# Patient Record
Sex: FEMALE | Race: WHITE | Employment: OTHER | URBAN - METROPOLITAN AREA
[De-identification: names, ages, dates, MRNs, and addresses within clinical notes are randomized per-mention and may not be internally consistent; named-entity substitution may affect disease eponyms.]

---

## 2018-04-24 ENCOUNTER — ANESTHESIA EVENT (OUTPATIENT)
Dept: OPERATING ROOM | Age: 83
End: 2018-04-24
Payer: MEDICARE

## 2018-04-24 ASSESSMENT — LIFESTYLE VARIABLES: SMOKING_STATUS: 0

## 2018-04-25 ENCOUNTER — ANESTHESIA (OUTPATIENT)
Dept: OPERATING ROOM | Age: 83
End: 2018-04-25
Payer: MEDICARE

## 2018-04-25 ENCOUNTER — HOSPITAL ENCOUNTER (OUTPATIENT)
Age: 83
Setting detail: OUTPATIENT SURGERY
Discharge: HOME | End: 2018-04-25
Attending: SURGERY | Admitting: SURGERY
Payer: MEDICARE

## 2018-04-25 VITALS
WEIGHT: 152 LBS | BODY MASS INDEX: 29.84 KG/M2 | DIASTOLIC BLOOD PRESSURE: 70 MMHG | SYSTOLIC BLOOD PRESSURE: 132 MMHG | HEART RATE: 82 BPM | TEMPERATURE: 98 F | HEIGHT: 60 IN | OXYGEN SATURATION: 94 % | RESPIRATION RATE: 14 BRPM

## 2018-04-25 VITALS
SYSTOLIC BLOOD PRESSURE: 137 MMHG | RESPIRATION RATE: 24 BRPM | DIASTOLIC BLOOD PRESSURE: 58 MMHG | OXYGEN SATURATION: 90 %

## 2018-04-25 LAB — GLUCOSE BLD-MCNC: 170 MG/DL

## 2018-04-25 PROCEDURE — 6360000002 HC RX W HCPCS: Performed by: NURSE ANESTHETIST, CERTIFIED REGISTERED

## 2018-04-25 PROCEDURE — 3600000002 HC SURGERY LEVEL 2 BASE: Performed by: SURGERY

## 2018-04-25 PROCEDURE — 82962 GLUCOSE BLOOD TEST: CPT | Performed by: SURGERY

## 2018-04-25 PROCEDURE — 3700000001 HC ADD 15 MINUTES (ANESTHESIA): Performed by: SURGERY

## 2018-04-25 PROCEDURE — 2500000003 HC RX 250 WO HCPCS: Performed by: NURSE ANESTHETIST, CERTIFIED REGISTERED

## 2018-04-25 PROCEDURE — 7100000011 HC PHASE II RECOVERY - ADDTL 15 MIN: Performed by: SURGERY

## 2018-04-25 PROCEDURE — 3700000000 HC ANESTHESIA ATTENDED CARE: Performed by: SURGERY

## 2018-04-25 PROCEDURE — 2500000003 HC RX 250 WO HCPCS: Performed by: SURGERY

## 2018-04-25 PROCEDURE — 2709999900 HC NON-CHARGEABLE SUPPLY: Performed by: SURGERY

## 2018-04-25 PROCEDURE — 2580000003 HC RX 258: Performed by: ANESTHESIOLOGY

## 2018-04-25 PROCEDURE — 7100000010 HC PHASE II RECOVERY - FIRST 15 MIN: Performed by: SURGERY

## 2018-04-25 PROCEDURE — 3600000012 HC SURGERY LEVEL 2 ADDTL 15MIN: Performed by: SURGERY

## 2018-04-25 RX ORDER — LIDOCAINE HYDROCHLORIDE 20 MG/ML
INJECTION, SOLUTION EPIDURAL; INFILTRATION; INTRACAUDAL; PERINEURAL PRN
Status: DISCONTINUED | OUTPATIENT
Start: 2018-04-25 | End: 2018-04-25 | Stop reason: SDUPTHER

## 2018-04-25 RX ORDER — HYDROCODONE BITARTRATE AND ACETAMINOPHEN 5; 325 MG/1; MG/1
1 TABLET ORAL PRN
Status: DISCONTINUED | OUTPATIENT
Start: 2018-04-25 | End: 2018-04-25 | Stop reason: HOSPADM

## 2018-04-25 RX ORDER — LIDOCAINE HYDROCHLORIDE AND EPINEPHRINE 10; 10 MG/ML; UG/ML
INJECTION, SOLUTION INFILTRATION; PERINEURAL PRN
Status: DISCONTINUED | OUTPATIENT
Start: 2018-04-25 | End: 2018-04-25 | Stop reason: HOSPADM

## 2018-04-25 RX ORDER — HYDROCODONE BITARTRATE AND ACETAMINOPHEN 5; 325 MG/1; MG/1
2 TABLET ORAL PRN
Status: DISCONTINUED | OUTPATIENT
Start: 2018-04-25 | End: 2018-04-25 | Stop reason: HOSPADM

## 2018-04-25 RX ORDER — PROPOFOL 10 MG/ML
INJECTION, EMULSION INTRAVENOUS CONTINUOUS PRN
Status: DISCONTINUED | OUTPATIENT
Start: 2018-04-25 | End: 2018-04-25 | Stop reason: SDUPTHER

## 2018-04-25 RX ORDER — PROPOFOL 10 MG/ML
INJECTION, EMULSION INTRAVENOUS PRN
Status: DISCONTINUED | OUTPATIENT
Start: 2018-04-25 | End: 2018-04-25 | Stop reason: SDUPTHER

## 2018-04-25 RX ORDER — ALFENTANIL HYDROCHLORIDE 500 UG/ML
INJECTION INTRAVENOUS PRN
Status: DISCONTINUED | OUTPATIENT
Start: 2018-04-25 | End: 2018-04-25 | Stop reason: SDUPTHER

## 2018-04-25 RX ORDER — SODIUM CHLORIDE, SODIUM LACTATE, POTASSIUM CHLORIDE, CALCIUM CHLORIDE 600; 310; 30; 20 MG/100ML; MG/100ML; MG/100ML; MG/100ML
INJECTION, SOLUTION INTRAVENOUS CONTINUOUS
Status: DISCONTINUED | OUTPATIENT
Start: 2018-04-25 | End: 2018-04-25 | Stop reason: HOSPADM

## 2018-04-25 RX ORDER — BUPIVACAINE HYDROCHLORIDE AND EPINEPHRINE 2.5; 5 MG/ML; UG/ML
INJECTION, SOLUTION EPIDURAL; INFILTRATION; INTRACAUDAL; PERINEURAL PRN
Status: DISCONTINUED | OUTPATIENT
Start: 2018-04-25 | End: 2018-04-25 | Stop reason: HOSPADM

## 2018-04-25 RX ADMIN — PROPOFOL 50 MCG/KG/MIN: 10 INJECTION, EMULSION INTRAVENOUS at 10:14

## 2018-04-25 RX ADMIN — ALFENTANIL HYDROCHLORIDE 250 MCG: 500 INJECTION INTRAVENOUS at 10:25

## 2018-04-25 RX ADMIN — ALFENTANIL HYDROCHLORIDE 250 MCG: 500 INJECTION INTRAVENOUS at 10:50

## 2018-04-25 RX ADMIN — PROPOFOL 20 MG: 10 INJECTION, EMULSION INTRAVENOUS at 10:14

## 2018-04-25 RX ADMIN — LIDOCAINE HYDROCHLORIDE 25 MG: 20 INJECTION, SOLUTION EPIDURAL; INFILTRATION; INTRACAUDAL; PERINEURAL at 10:14

## 2018-04-25 RX ADMIN — SODIUM CHLORIDE, POTASSIUM CHLORIDE, SODIUM LACTATE AND CALCIUM CHLORIDE: 600; 310; 30; 20 INJECTION, SOLUTION INTRAVENOUS at 08:44

## 2018-04-25 RX ADMIN — ALFENTANIL HYDROCHLORIDE 250 MCG: 500 INJECTION INTRAVENOUS at 10:55

## 2018-04-25 RX ADMIN — ALFENTANIL HYDROCHLORIDE 250 MCG: 500 INJECTION INTRAVENOUS at 10:14

## 2018-04-25 ASSESSMENT — PAIN SCALES - GENERAL
PAINLEVEL_OUTOF10: 0
PAINLEVEL_OUTOF10: 0

## 2018-04-25 ASSESSMENT — PULMONARY FUNCTION TESTS
PIF_VALUE: 0

## 2018-04-25 ASSESSMENT — PAIN - FUNCTIONAL ASSESSMENT: PAIN_FUNCTIONAL_ASSESSMENT: 0-10

## 2018-04-25 ASSESSMENT — PAIN DESCRIPTION - DESCRIPTORS: DESCRIPTORS: NUMBNESS

## 2018-07-12 ENCOUNTER — HOSPITAL ENCOUNTER (EMERGENCY)
Age: 83
Discharge: HOME | End: 2018-07-12
Payer: MEDICARE

## 2018-07-12 VITALS
DIASTOLIC BLOOD PRESSURE: 70 MMHG | HEART RATE: 72 BPM | OXYGEN SATURATION: 98 % | SYSTOLIC BLOOD PRESSURE: 138 MMHG | TEMPERATURE: 98.3 F | RESPIRATION RATE: 20 BRPM

## 2018-07-12 DIAGNOSIS — N30.01 ACUTE CYSTITIS WITH HEMATURIA: Primary | ICD-10-CM

## 2018-07-12 DIAGNOSIS — R11.0 NAUSEA: ICD-10-CM

## 2018-07-12 LAB
ANION GAP SERPL CALCULATED.3IONS-SCNC: 11 MMOL/L (ref 7–16)
BACTERIA: ABNORMAL /HPF
BASOPHILS ABSOLUTE: 0.05 E9/L (ref 0–0.2)
BASOPHILS RELATIVE PERCENT: 0.6 % (ref 0–2)
BILIRUBIN URINE: NEGATIVE
BLOOD, URINE: ABNORMAL
BUN BLDV-MCNC: 7 MG/DL (ref 8–23)
CALCIUM SERPL-MCNC: 9.6 MG/DL (ref 8.6–10.2)
CHLORIDE BLD-SCNC: 106 MMOL/L (ref 98–107)
CLARITY: ABNORMAL
CO2: 21 MMOL/L (ref 22–29)
COLOR: YELLOW
CREAT SERPL-MCNC: 0.3 MG/DL (ref 0.5–1)
EOSINOPHILS ABSOLUTE: 1.09 E9/L (ref 0.05–0.5)
EOSINOPHILS RELATIVE PERCENT: 13.3 % (ref 0–6)
EPITHELIAL CELLS, UA: ABNORMAL /HPF
GFR AFRICAN AMERICAN: >60
GFR NON-AFRICAN AMERICAN: >60 ML/MIN/1.73
GLUCOSE BLD-MCNC: 130 MG/DL (ref 74–109)
GLUCOSE URINE: NEGATIVE MG/DL
HCT VFR BLD CALC: 38 % (ref 34–48)
HEMOGLOBIN: 13.3 G/DL (ref 11.5–15.5)
IMMATURE GRANULOCYTES #: 0.02 E9/L
IMMATURE GRANULOCYTES %: 0.2 % (ref 0–5)
KETONES, URINE: ABNORMAL MG/DL
LEUKOCYTE ESTERASE, URINE: ABNORMAL
LYMPHOCYTES ABSOLUTE: 1.74 E9/L (ref 1.5–4)
LYMPHOCYTES RELATIVE PERCENT: 21.3 % (ref 20–42)
MCH RBC QN AUTO: 32 PG (ref 26–35)
MCHC RBC AUTO-ENTMCNC: 35 % (ref 32–34.5)
MCV RBC AUTO: 91.3 FL (ref 80–99.9)
MONOCYTES ABSOLUTE: 0.71 E9/L (ref 0.1–0.95)
MONOCYTES RELATIVE PERCENT: 8.7 % (ref 2–12)
NEUTROPHILS ABSOLUTE: 4.56 E9/L (ref 1.8–7.3)
NEUTROPHILS RELATIVE PERCENT: 55.9 % (ref 43–80)
NITRITE, URINE: POSITIVE
PDW BLD-RTO: 12.4 FL (ref 11.5–15)
PH UA: 5.5 (ref 5–9)
PLATELET # BLD: 203 E9/L (ref 130–450)
PMV BLD AUTO: 9.4 FL (ref 7–12)
POTASSIUM SERPL-SCNC: 3.5 MMOL/L (ref 3.5–5)
PROTEIN UA: NEGATIVE MG/DL
RBC # BLD: 4.16 E12/L (ref 3.5–5.5)
RBC UA: ABNORMAL /HPF (ref 0–2)
SODIUM BLD-SCNC: 138 MMOL/L (ref 132–146)
SPECIFIC GRAVITY UA: 1.02 (ref 1–1.03)
UROBILINOGEN, URINE: 1 E.U./DL
WBC # BLD: 8.2 E9/L (ref 4.5–11.5)
WBC UA: >20 /HPF (ref 0–5)

## 2018-07-12 PROCEDURE — 80048 BASIC METABOLIC PNL TOTAL CA: CPT

## 2018-07-12 PROCEDURE — 85025 COMPLETE CBC W/AUTO DIFF WBC: CPT

## 2018-07-12 PROCEDURE — 99212 OFFICE O/P EST SF 10 MIN: CPT

## 2018-07-12 PROCEDURE — 81001 URINALYSIS AUTO W/SCOPE: CPT

## 2018-07-12 PROCEDURE — 87186 SC STD MICRODIL/AGAR DIL: CPT

## 2018-07-12 PROCEDURE — 36415 COLL VENOUS BLD VENIPUNCTURE: CPT

## 2018-07-12 PROCEDURE — 87088 URINE BACTERIA CULTURE: CPT

## 2018-07-12 RX ORDER — CEPHALEXIN 500 MG/1
500 CAPSULE ORAL 2 TIMES DAILY
Qty: 14 CAPSULE | Refills: 0 | Status: SHIPPED | OUTPATIENT
Start: 2018-07-12 | End: 2018-07-19

## 2018-07-12 RX ORDER — ONDANSETRON 4 MG/1
4 TABLET, ORALLY DISINTEGRATING ORAL EVERY 8 HOURS PRN
Qty: 10 TABLET | Refills: 0 | Status: SHIPPED | OUTPATIENT
Start: 2018-07-12 | End: 2019-06-12

## 2018-07-15 LAB
ORGANISM: ABNORMAL
URINE CULTURE, ROUTINE: ABNORMAL
URINE CULTURE, ROUTINE: ABNORMAL

## 2019-06-12 ENCOUNTER — OFFICE VISIT (OUTPATIENT)
Dept: PAIN MANAGEMENT | Age: 84
End: 2019-06-12
Payer: MEDICARE

## 2019-06-12 ENCOUNTER — PREP FOR PROCEDURE (OUTPATIENT)
Dept: PAIN MANAGEMENT | Age: 84
End: 2019-06-12

## 2019-06-12 VITALS
TEMPERATURE: 98.4 F | HEIGHT: 59 IN | OXYGEN SATURATION: 92 % | SYSTOLIC BLOOD PRESSURE: 100 MMHG | WEIGHT: 140 LBS | RESPIRATION RATE: 18 BRPM | HEART RATE: 61 BPM | DIASTOLIC BLOOD PRESSURE: 60 MMHG | BODY MASS INDEX: 28.22 KG/M2

## 2019-06-12 DIAGNOSIS — M50.30 DDD (DEGENERATIVE DISC DISEASE), CERVICAL: ICD-10-CM

## 2019-06-12 DIAGNOSIS — M48.061 SPINAL STENOSIS OF LUMBAR REGION, UNSPECIFIED WHETHER NEUROGENIC CLAUDICATION PRESENT: ICD-10-CM

## 2019-06-12 DIAGNOSIS — M19.012 OSTEOARTHRITIS OF BOTH SHOULDERS, UNSPECIFIED OSTEOARTHRITIS TYPE: Primary | ICD-10-CM

## 2019-06-12 DIAGNOSIS — M19.012 PRIMARY OSTEOARTHRITIS OF BOTH SHOULDERS: Chronic | ICD-10-CM

## 2019-06-12 DIAGNOSIS — M25.512 PAIN OF BOTH SHOULDER JOINTS: Primary | ICD-10-CM

## 2019-06-12 DIAGNOSIS — M19.011 PRIMARY OSTEOARTHRITIS OF BOTH SHOULDERS: Chronic | ICD-10-CM

## 2019-06-12 DIAGNOSIS — M19.011 OSTEOARTHRITIS OF BOTH SHOULDERS, UNSPECIFIED OSTEOARTHRITIS TYPE: Primary | ICD-10-CM

## 2019-06-12 DIAGNOSIS — M51.36 DDD (DEGENERATIVE DISC DISEASE), LUMBAR: ICD-10-CM

## 2019-06-12 DIAGNOSIS — M25.511 PAIN OF BOTH SHOULDER JOINTS: Primary | ICD-10-CM

## 2019-06-12 DIAGNOSIS — M25.512 PAIN OF BOTH SHOULDER JOINTS: ICD-10-CM

## 2019-06-12 DIAGNOSIS — M25.511 PAIN OF BOTH SHOULDER JOINTS: ICD-10-CM

## 2019-06-12 DIAGNOSIS — M25.511 CHRONIC PAIN OF BOTH SHOULDERS: ICD-10-CM

## 2019-06-12 DIAGNOSIS — M19.011 OSTEOARTHRITIS OF BOTH SHOULDERS, UNSPECIFIED OSTEOARTHRITIS TYPE: Chronic | ICD-10-CM

## 2019-06-12 DIAGNOSIS — M54.2 CERVICALGIA: ICD-10-CM

## 2019-06-12 DIAGNOSIS — M19.012 OSTEOARTHRITIS OF BOTH SHOULDERS, UNSPECIFIED OSTEOARTHRITIS TYPE: Chronic | ICD-10-CM

## 2019-06-12 DIAGNOSIS — M47.817 LUMBOSACRAL SPONDYLOSIS WITHOUT MYELOPATHY: ICD-10-CM

## 2019-06-12 DIAGNOSIS — G89.29 CHRONIC PAIN OF BOTH SHOULDERS: ICD-10-CM

## 2019-06-12 DIAGNOSIS — M25.512 CHRONIC PAIN OF BOTH SHOULDERS: ICD-10-CM

## 2019-06-12 PROCEDURE — 99204 OFFICE O/P NEW MOD 45 MIN: CPT | Performed by: ANESTHESIOLOGY

## 2019-06-12 RX ORDER — ATORVASTATIN CALCIUM 20 MG/1
TABLET, FILM COATED ORAL
Refills: 3 | COMMUNITY
Start: 2019-06-08 | End: 2019-06-12

## 2019-06-12 RX ORDER — ALPRAZOLAM 0.25 MG/1
TABLET ORAL
Refills: 1 | COMMUNITY
Start: 2019-04-15 | End: 2019-09-04

## 2019-06-12 RX ORDER — LISINOPRIL 10 MG/1
TABLET ORAL
Refills: 3 | COMMUNITY
Start: 2019-03-31 | End: 2019-06-12

## 2019-06-12 SDOH — HEALTH STABILITY: MENTAL HEALTH: HOW MANY STANDARD DRINKS CONTAINING ALCOHOL DO YOU HAVE ON A TYPICAL DAY?: 1 OR 2

## 2019-06-12 NOTE — PROGRESS NOTES
[address]        [address] [facility], [address] [facility]      [phone]                  Consult Note      Patient:  AURELIA Patel 1935    Date of Service:  19     Requesting Physician:  Ivy Potter, *    Reason for Consult:      Patient presents with complaints of Bilateral shoulder pain, chronic neck pain and low back pain    HISTORY OF PRESENT ILLNESS:      Ms. Jonathon Orlando is a 80 y.o. female presented today to [address] [place] [address] for evaluation of  :    1) Chronic bilateral shoulder pain from severe OA. She has undergone shoulder injection in the past - multiple times- which has helped her for few weeks. Last injection about 4-5 weeks ago. 2) Chronic neck pain. Had cevical JOHNNIE in the past which had helped her. No Upper extremity radiation    3) Chronic low back pain in the lumbar area- no LE radiation. Pain is constant and is described as aching, sharp and throbbing. Pain does not radiate    She  does not have numbness, tingling, weakness of the upper extremities and both lower extremities     Patient does not have new bladder or bowel dysfunction. She has chronic CAMERON - uses a diaper for few years. Alleviating factors include: rest and medications. Aggravating factors include: movement, standing, sitting, bending, lifting.      Pain causes functional limitations/ limits Adl's : Yes  She has been taking Norco: 7.5 mg 2-3 times a  Day given by her PCP - has been taking this for few years   Pain meds helps her with the pain and to be functional.    Previous treatments:   Physical Therapy : yes, none recently     Chiropractic treatment: no    Medications: - NSAID's : yes -             - Membrane stabilizers : no            - Opioids : yes,             - Adjuvants or Others : no    TENS Unit: no    Surgeries: no spine surgeries    Interventional Pain procedures/ nerve blocks: yes,       She has been on anticoagulation medications yes,  and include ASA. - 81    She has not been on herbal supplements. She is not diabetic. H/O Smoking: no  H/O alcohol abuse : no  H/O Illicit drug use : no    Employment: retired    Imaging:     She had recent X ray of the shoulder done - shows significant arthritis. Will get the reports of the recent imaging of the shoulder. 9/2016:  EXAM: XR SHOULDER RIGHT STANDARD, XR HUMERUS RIGHT STANDARD, XR RADIUS   ULNA RIGHT STANDARD       COMPARISON: None       HISTORY:  pain and decreased range of motion after fall       TECHNIQUE: 2 views right shoulder, 2 views right humerus and 2 views   right radius/ulna       FINDINGS: Severe glenohumeral arthritis identified. Shoulder alignment   is normal. The humerus is normal in contour. The radius and ulna are   also within normal limits without evidence of fracture.               Impression       Right shoulder:   1.  No acute findings.       Right humerus:   1.  No acute findings.       Right radius and ulna:   1.  No acute findings.        CT cervical spine:  2016:     There is normal alignment of the cervical spine.  The vertebral bodies   are normal in height and alignment.  There is no evidence of fracture   or dislocation.             Past Medical History:   Diagnosis Date    Arthritis     CAD (coronary artery disease)     Diabetes mellitus ([facility] [place] [address]     diet controlled    Hyperlipidemia     Hypertension     Latex allergy     Myocardial infarction [facility]) 2012    Shoulder pain        Past Surgical History:   Procedure Laterality Date    AORTIC VALVE REPLACEMENT  june 2012   [facility] CARDIAC SURGERY  june 2012    triple bypass    CARPAL TUNNEL RELEASE      CARPAL TUNNEL RELEASE Right 04/25/2018    CORONARY ARTERY BYPASS GRAFT  2012    NERVE BLOCK  11/24/14    bilateral shoulder injection #1    NERVE BLOCK Bilateral 12/1/2014    alexys shoulder injections  #2    NERVE BLOCK N/A 1/7/2015    cervical epidural  #1    NERVE BLOCK N/A 1 21 15    cerv ep #2    NERVE BLOCK Bilateral 01/23/2017    Bilateral shoulder injection #1    [facility] N/CARPAL TUNNEL SURG Right 4/25/2018    RIGHT HAND CARPAL TUNNEL RELEASE performed by Suzanna Mckay MD at [address] [place] [address]       Prior to Admission medications    Medication Sig Start Date End Date Taking? Authorizing Provider   ALPRAZolam (XANAX) 0.25 MG tablet TAKE 1/2 TABLET BY MOUTH ONE TIME DAILY AS NEEDED FOR ANXIETY (PRESCRIPTION TO LAST 30 DAYS) 4/15/19  Yes Historical Provider, MD   diphenhydrAMINE (BENADRYL) 25 MG capsule Take 25 mg by mouth every 6 hours as needed for Itching   Yes Historical Provider, MD   aspirin 81 MG tablet Take 81 mg by mouth daily Stopped 4 days pre op   Yes Historical Provider, MD   HYDROcodone-acetaminophen (NORCO) 7.5-325 MG per tablet Take 1 tablet by mouth every 8 hours as needed for Pain. Yes Historical Provider, MD   ATORVASTATIN CALCIUM PO Take 20 mg by mouth nightly    Yes Historical Provider, MD   amLODIPine (NORVASC) 10 MG tablet Take 10 mg by mouth daily    Yes Historical Provider, MD   metoprolol (LOPRESSOR) 25 MG tablet Take 12.5 mg by mouth 2 times daily. Yes Historical Provider, MD   LISINOPRIL PO Take 10 mg by mouth 2 times daily. Yes Historical Provider, MD       Allergies   Allergen Reactions    Latex Rash     contact    Adhesive Tape     Glimepiride      Itching--9/2013    Tetanus Toxoids Swelling       Social History     Socioeconomic History    Marital status:       Spouse name: Not on file    Number of children: Not on file    Years of education: Not on file    Highest education level: Not on file   Occupational History    Not on file   Social Needs    Financial resource strain: Not on file    Food insecurity:     Worry: Not on file     Inability: Not on file    Transportation needs:     Medical: Not on file     Non-medical: Not on file   Tobacco Use    Smoking status: Never Smoker    Smokeless tobacco: Never Used   Substance and Sexual Activity    Alcohol use: Yes     Drinks per session: 1 or 2     Binge frequency: Weekly    Drug use: No    Sexual activity: Never   Lifestyle    Physical activity:     Days per week: Not on file     Minutes per session: Not on file    Stress: Not on file   Relationships    Social connections:     Talks on phone: Not on file     Gets together: Not on file     Attends Tenriism service: Not on file     Active member of club or organization: Not on file     Attends meetings of clubs or organizations: Not on file     Relationship status: Not on file    Intimate partner violence:     Fear of current or ex partner: Not on file     Emotionally abused: Not on file     Physically abused: Not on file     Forced sexual activity: Not on file   Other Topics Concern    Not on file   Social History Narrative    Not on file       Family History   Problem Relation Age of Onset    Cancer Mother     Diabetes Mother     Hypertension Mother     Hypertension Father     Heart Failure Father     Diabetes Sister     Diabetes Sister     Hypertension Sister     Arthritis Other        REVIEW OF SYSTEMS:   Review of Systems - General ROS: negative for - chills, fever, night sweats, weight gain or weight loss  Psychological ROS: positive for - anxiety  Ophthalmic ROS: negative  ENT ROS: negative  Allergy and Immunology ROS: negative  Hematological and Lymphatic ROS: no bleeding disorder  Endocrine ROS: negative  Respiratory ROS: no cough, shortness of breath, or wheezing  Cardiovascular ROS: no chest pain or dyspnea on exertion  Gastrointestinal ROS: no abdominal pain, change in bowel habits, or black or bloody stools  Genito-Urinary ROS: no dysuria, trouble voiding, or hematuria  + for increased frequency  Musculoskeletal ROS: see HPI  Neurological ROS: no TIA or stroke symptoms  Dermatological ROS: Dry skin + Pruritis +     PHYSICAL EXAMINATION:      /60   Pulse 61   Temp 98.4 °F (36.9 °C)   Resp 18   Ht 4' 11\" (1.499 m)   Wt 140 lb (63.5 kg)   SpO2 92%   BMI 28.28 kg/m²     General:      General appearance:  Pleasant and well-hydrated, in no distress and A & O x 3  Build:Overweight  Function: Rises from a seated position with difficulty    HEENT:    Head:normocephalic, atraumatic  Pupils:regular, round, equal  Sclera: icterus absent    Lungs:    Breathing:normal breathing pattern     CVS:     RRR    Abdomen:    Shape:non-distended and normal  Tenderness:none  Guarding:none    Cervical spine:    Inspection:normal  Palpation: no tenderness paravertebral muscles, tenderness trapezium, left, right and positive. Range of motion:reduced flexion, extension, rotation bilaterally and is painful. Spurling's: negative bilaterally    Thoracic spine:     Spine inspection:normal   Palpation:No tenderness over the midline and paraspinal area, bilaterally  Range of motion:normal in flexion, extension rotation bilateral and is not painful. Lumbar spine:    Spine inspection: Normal   Palpation: Tenderness paravertebral muscles No bilaterally  Range of motion: Decreased, flexion Decreased, Lateral bending, extension and rotation bilaterally reduced is painful. Sacroiliac joint tenderness No bilaterally  ELINA test: negative bilaterally  Gaenslen's test:negative bilaterally   Piriformis tenderness: negative bilaterally  SLR : negative bilaterally  Trochanteric bursa tenderness: negative bilaterally  CVA tenderness:No       Musculoskeletal:    Trigger points in trapezius: No bilaterally  Trigger points in rhomboids: No bilaterally  Trigger points in Paravertebral: No bilaterally      Extremities:    Tremors:None bilaterally upper and lower  Range of motion:Generally reduced shoulders  Intact:Yes  Varicose veins:absent   Pulses:present Lt radial  Cyanosis:none  Edema:+ b/l LE     Shoulders:  Bilateral shoulder : ROM reduced, significant tenderness over the shoulder bilateral, significant pain on ROM, marked limited to ROM . Atrophy of the shoulder muscles noted. Knee:    ROM : decreased  Joint line tenderness : minimal    Neurological:    Sensory: Normal to light touch     Motor:   Weakness of the bilateral shoulder muscles (limited by pain) otherwise UE muscle strength  Is equal    B/l LE motor power 4-5/5    Reflexes:    Bilaterally equal 1-2+    Gait: does not use assistive device. Dermatology:    Skin:no rashes or lesions noted    Assessment/Plan:     Diagnosis Orders   1. Osteoarthritis of both shoulders, unspecified osteoarthritis type     2. Primary osteoarthritis of both shoulders     3. Chronic pain of both shoulders     4. DDD (degenerative disc disease), lumbar     5. Lumbosacral spondylosis without myelopathy     6. DDD (degenerative disc disease), cervical     7. Spinal stenosis of lumbar region, unspecified whether neurogenic claudication present         80 y.o. female with h/o b/l Chronic shoulder pain with severe OA. Failed multiple modalities of treatment. Including meds/ PT/ shoulder injection. Set up for bilateral suprascapular block under fluoroscopy. RBA discussed. TENS unit . Local compound cream.    Consider adjuvant meds later if pain persists. Apparently has seen ortho - but patient not too keen on surgery. Pain meds helps her with pain and functionality. Can continue this with her PCP. Has issues with dry skin and itching- recommend dermatology eval.    Urine screen today: no    Counseling :    Patient encouraged to stay active and to watch/lose weight    Encouraged to continue Regular home exercise program as tolerated - stretching / strengthening. Treatment plan discussed with the patient and her daughter including medication and procedure side effects. Controlled Substances Monitoring:  [place]ARRS reviewed- complaint.         Radha Brown MD    CC:    Dm Morales MD  [facility], [address] [facility]

## 2019-06-12 NOTE — PROGRESS NOTES
Patient:  AURELIA Marie 1935  Date of Service:  19        Patient presents with complaints of bilateral shoulder pain that started 15 years ago and has been getting worse. She states the pain began following No specific cause    Pain is constant and is described as aching, dull, sharp, penetrating, miserable and grinding. She rates the pain as a 8/10 on her worst day , 10/10 on her best day, and a 8/10 on average on the VAS scale. Pain does radiate to the upper extremities and between her scapulas. She  does not have numbness, tingling of the the upper extremities. Alleviating factors include: Norco.  Aggravating factors include:  movement, lifting. She states that the pain does keep her from sleeping at night. She took her last dose of Norco this morning.      She has been on anticoagulation medications to include ASA and is managed by PCP Dr. Jaret Boston    Previous treatments: Nerve block (Depo medrol and marcaine injections.)    Personal Expectations from this treatment: increase activity and decrease pain    /60   Pulse 61   Temp 98.4 °F (36.9 °C)   Resp 18   Ht 4' 11\" (1.499 m)   Wt 140 lb (63.5 kg)   SpO2 92%   BMI 28.28 kg/m²

## 2019-06-21 ENCOUNTER — ANESTHESIA EVENT (OUTPATIENT)
Dept: OPERATING ROOM | Age: 84
End: 2019-06-21
Payer: MEDICARE

## 2019-06-21 ASSESSMENT — LIFESTYLE VARIABLES: SMOKING_STATUS: 0

## 2019-06-21 NOTE — ANESTHESIA PRE PROCEDURE
Department of Anesthesiology  Preprocedure Note       Name:  Billey Shone   Age:  80 y.o.  :  1935                                          MRN:  [id number]         Date:  2019      Surgeon: Jermaine Martinez):  Ghislaine German MD    Procedure: BILATERAL SUPRASCAPULAR BLOCK UNDER FLUOROSCOPY (CPT: 68369) (Bilateral )    Medications prior to admission:   Prior to Admission medications    Medication Sig Start Date End Date Taking? Authorizing Provider   ALPRAZolam (XANAX) 0.25 MG tablet TAKE 1/2 TABLET BY MOUTH ONE TIME DAILY AS NEEDED FOR ANXIETY (PRESCRIPTION TO LAST 30 DAYS) 4/15/19   Historical Provider, MD   diphenhydrAMINE (BENADRYL) 25 MG capsule Take 25 mg by mouth every 6 hours as needed for Itching    Historical Provider, MD   aspirin 81 MG tablet Take 81 mg by mouth daily Stopped 4 days pre op    Historical Provider, MD   HYDROcodone-acetaminophen (NORCO) 7.5-325 MG per tablet Take 1 tablet by mouth every 8 hours as needed for Pain. Historical Provider, MD   ATORVASTATIN CALCIUM PO Take 20 mg by mouth nightly     Historical Provider, MD   amLODIPine (NORVASC) 10 MG tablet Take 10 mg by mouth daily     Historical Provider, MD   metoprolol (LOPRESSOR) 25 MG tablet Take 12.5 mg by mouth 2 times daily. Historical Provider, MD   LISINOPRIL PO Take 10 mg by mouth 2 times daily. Historical Provider, MD       Current medications:    Current Outpatient Medications   Medication Sig Dispense Refill    ALPRAZolam (XANAX) 0.25 MG tablet TAKE 1/2 TABLET BY MOUTH ONE TIME DAILY AS NEEDED FOR ANXIETY (PRESCRIPTION TO LAST 30 DAYS)  1    diphenhydrAMINE (BENADRYL) 25 MG capsule Take 25 mg by mouth every 6 hours as needed for Itching      aspirin 81 MG tablet Take 81 mg by mouth daily Stopped 4 days pre op      HYDROcodone-acetaminophen (NORCO) 7.5-325 MG per tablet Take 1 tablet by mouth every 8 hours as needed for Pain.        ATORVASTATIN CALCIUM PO Take 20 mg by mouth nightly       amLODIPine (NORVASC) 10 MG tablet Take 10 mg by mouth daily       metoprolol (LOPRESSOR) 25 MG tablet Take 12.5 mg by mouth 2 times daily.  LISINOPRIL PO Take 10 mg by mouth 2 times daily. No current facility-administered medications for this visit. Allergies: Allergies   Allergen Reactions    Latex Rash     contact    Adhesive Tape     Glimepiride      Itching--9/2013    Tetanus Toxoids Swelling       Problem List:    Patient Active Problem List   Diagnosis Code    Osteoarthritis of both shoulders M19.011, M19.012    DDD (degenerative disc disease), cervical M50.30    Cervical facet syndrome M47.812    Somatic dysfunction bilateral shoulders M99.09    Subarachnoid hemorrhage (HCC) I60.9    Type 2 diabetes mellitus (HCC) E11.9    Periorbital ecchymosis of right eye S00. 11XA    Chronic pain of both shoulders M25.511, G89.29, M25.512    DDD (degenerative disc disease), lumbar M51.36    Lumbosacral spondylosis without myelopathy M47.817    Spinal stenosis of lumbar region M48.061    Pain of both shoulder joints M25.511, M25.512    Cervicalgia M54.2       Past Medical History:        Diagnosis Date    Arthritis     CAD (coronary artery disease)     Diabetes mellitus ([facility] [address]     diet controlled    Hyperlipidemia     Hypertension     Latex allergy     Myocardial infarction [facility]) 2012    Shoulder pain        Past Surgical History:        Procedure Laterality Date    AORTIC VALVE REPLACEMENT  june 2012   [facility] CARDIAC SURGERY  june 2012    triple bypass    CARPAL TUNNEL RELEASE      CARPAL TUNNEL RELEASE Right 04/25/2018    CORONARY ARTERY BYPASS GRAFT  2012    NERVE BLOCK  11/24/14    bilateral shoulder injection #1    NERVE BLOCK Bilateral 12/1/2014    alexys shoulder injections  #2    NERVE BLOCK N/A 1/7/2015    cervical epidural  #1    NERVE BLOCK N/A 1 21 15    cerv ep #2    NERVE BLOCK Bilateral 01/23/2017    Bilateral shoulder injection #1    [facility] N/CARPAL TUNNEL SURG Right 4/25/2018    RIGHT HAND CARPAL TUNNEL RELEASE performed by Riya Bee MD at [address] History:    Social History     Tobacco Use    Smoking status: Never Smoker    Smokeless tobacco: Never Used   Substance Use Topics    Alcohol use: Yes     Drinks per session: 1 or 2     Binge frequency: Weekly                                Counseling given: Not Answered      Vital Signs (Current): There were no vitals filed for this visit. BP Readings from Last 3 Encounters:   06/12/19 100/60   07/12/18 138/70   04/25/18 (!) 137/58       NPO Status:                                                                                 BMI:   Wt Readings from Last 3 Encounters:   06/12/19 140 lb (63.5 kg)   04/25/18 152 lb (68.9 kg)   01/18/17 140 lb (63.5 kg)     There is no height or weight on file to calculate BMI.    CBC:   Lab Results   Component Value Date    WBC 8.2 07/12/2018    RBC 4.16 07/12/2018    HGB 13.3 07/12/2018    HCT 38.0 07/12/2018    MCV 91.3 07/12/2018    RDW 12.4 07/12/2018     07/12/2018       CMP:   Lab Results   Component Value Date     07/12/2018    K 3.5 07/12/2018     07/12/2018    CO2 21 07/12/2018    BUN 7 07/12/2018    CREATININE 0.3 07/12/2018    GFRAA >60 07/12/2018    LABGLOM >60 07/12/2018    GLUCOSE 130 07/12/2018    PROT 6.4 09/01/2016    CALCIUM 9.6 07/12/2018    BILITOT 0.9 09/01/2016    ALKPHOS 83 09/01/2016    AST 42 09/01/2016    ALT 43 09/01/2016       POC Tests: No results for input(s): POCGLU, POCNA, POCK, POCCL, POCBUN, POCHEMO, POCHCT in the last 72 hours.     Coags:   Lab Results   Component Value Date    PROTIME 10.8 09/01/2016    INR 1.0 09/01/2016    APTT 26.2 09/01/2016       HCG (If Applicable): No results found for: PREGTESTUR, PREGSERUM, HCG, HCGQUANT     ABGs: No results found for: PHART, PO2ART, VTJ6UCY, VBW5MKX, BEART, Q9TMHCIV     Type & Screen (If Applicable):  No results found for: [facility]    Anesthesia Evaluation  Patient summary reviewed and Nursing notes reviewed no history of anesthetic complications:   Airway: Mallampati: IV  TM distance: >3 FB   Neck ROM: full  Mouth opening: > = 3 FB Dental:    (+) edentulous      Pulmonary:Negative Pulmonary ROS and normal exam  breath sounds clear to auscultation      (-) not a current smoker                           Cardiovascular:    (+) hypertension:, valvular problems/murmurs (AVR):, past MI: > 6 months, CAD:, CABG/stent:, murmur, hyperlipidemia        Rhythm: regular  Rate: normal           Beta Blocker:  Dose within 24 Hrs         Neuro/Psych:   (+) neuromuscular disease:,              ROS comment: Hx subarachnoid hemorrhage GI/Hepatic/Renal:             Endo/Other:    (+) DiabetesType II DM, , : arthritis:., .                 Abdominal:         (-) obese     Vascular: negative vascular ROS. Anesthesia Plan      MAC     ASA 3     (Family will check with Dr. Soila Dumont to see if preop antibiotic is needed.)  Induction: intravenous. Anesthetic plan and risks discussed with patient. Plan discussed with CRNA. Attending anesthesiologist reviewed and agrees with Pre Eval content    Yonas Lemus MD   4/24/2018    DOS STAFF ADDENDUM:    Pt seen and examined, chart reviewed (including anesthesia, drug and allergy history). Anesthetic plan, risks, benefits, alternatives, and personnel involved discussed with patient. Patient verbalized an understanding and agrees to proceed. Plan discussed with care team members and agreed upon.     Yonas Lemus MD  Staff Anesthesiologist  8:38 AM

## 2019-06-25 ENCOUNTER — HOSPITAL ENCOUNTER (OUTPATIENT)
Age: 84
Setting detail: OUTPATIENT SURGERY
Discharge: HOME | End: 2019-06-25
Attending: ANESTHESIOLOGY | Admitting: ANESTHESIOLOGY
Payer: MEDICARE

## 2019-06-25 ENCOUNTER — ANESTHESIA (OUTPATIENT)
Dept: OPERATING ROOM | Age: 84
End: 2019-06-25
Payer: MEDICARE

## 2019-06-25 VITALS
OXYGEN SATURATION: 97 % | HEART RATE: 79 BPM | DIASTOLIC BLOOD PRESSURE: 64 MMHG | TEMPERATURE: 98 F | HEIGHT: 59 IN | WEIGHT: 146 LBS | SYSTOLIC BLOOD PRESSURE: 133 MMHG | RESPIRATION RATE: 16 BRPM | BODY MASS INDEX: 29.43 KG/M2

## 2019-06-25 VITALS
OXYGEN SATURATION: 99 % | RESPIRATION RATE: 17 BRPM | DIASTOLIC BLOOD PRESSURE: 82 MMHG | SYSTOLIC BLOOD PRESSURE: 196 MMHG

## 2019-06-25 DIAGNOSIS — M89.8X1 PAIN IN SCAPULA: ICD-10-CM

## 2019-06-25 PROCEDURE — 76942 ECHO GUIDE FOR BIOPSY: CPT

## 2019-06-25 PROCEDURE — 7100000011 HC PHASE II RECOVERY - ADDTL 15 MIN: Performed by: ANESTHESIOLOGY

## 2019-06-25 PROCEDURE — 3700000000 HC ANESTHESIA ATTENDED CARE: Performed by: ANESTHESIOLOGY

## 2019-06-25 PROCEDURE — 2580000003 HC RX 258: Performed by: ANESTHESIOLOGY

## 2019-06-25 PROCEDURE — 3700000001 HC ADD 15 MINUTES (ANESTHESIA): Performed by: ANESTHESIOLOGY

## 2019-06-25 PROCEDURE — 2500000003 HC RX 250 WO HCPCS: Performed by: ANESTHESIOLOGY

## 2019-06-25 PROCEDURE — 6360000002 HC RX W HCPCS: Performed by: NURSE ANESTHETIST, CERTIFIED REGISTERED

## 2019-06-25 PROCEDURE — 2709999900 HC NON-CHARGEABLE SUPPLY: Performed by: ANESTHESIOLOGY

## 2019-06-25 PROCEDURE — 3600000005 HC SURGERY LEVEL 5 BASE: Performed by: ANESTHESIOLOGY

## 2019-06-25 PROCEDURE — 64418 NJX AA&/STRD SPRSCAP NRV: CPT | Performed by: ANESTHESIOLOGY

## 2019-06-25 PROCEDURE — 76942 ECHO GUIDE FOR BIOPSY: CPT | Performed by: ANESTHESIOLOGY

## 2019-06-25 PROCEDURE — 7100000010 HC PHASE II RECOVERY - FIRST 15 MIN: Performed by: ANESTHESIOLOGY

## 2019-06-25 PROCEDURE — 3600000015 HC SURGERY LEVEL 5 ADDTL 15MIN: Performed by: ANESTHESIOLOGY

## 2019-06-25 PROCEDURE — 6360000002 HC RX W HCPCS: Performed by: ANESTHESIOLOGY

## 2019-06-25 RX ORDER — LIDOCAINE HYDROCHLORIDE 5 MG/ML
INJECTION, SOLUTION INFILTRATION; INTRAVENOUS PRN
Status: DISCONTINUED | OUTPATIENT
Start: 2019-06-25 | End: 2019-06-25 | Stop reason: ALTCHOICE

## 2019-06-25 RX ORDER — SODIUM CHLORIDE, SODIUM LACTATE, POTASSIUM CHLORIDE, CALCIUM CHLORIDE 600; 310; 30; 20 MG/100ML; MG/100ML; MG/100ML; MG/100ML
INJECTION, SOLUTION INTRAVENOUS CONTINUOUS
Status: DISCONTINUED | OUTPATIENT
Start: 2019-06-25 | End: 2019-06-25 | Stop reason: HOSPADM

## 2019-06-25 RX ORDER — FENTANYL CITRATE 50 UG/ML
INJECTION, SOLUTION INTRAMUSCULAR; INTRAVENOUS PRN
Status: DISCONTINUED | OUTPATIENT
Start: 2019-06-25 | End: 2019-06-25 | Stop reason: SDUPTHER

## 2019-06-25 RX ADMIN — SODIUM CHLORIDE, POTASSIUM CHLORIDE, SODIUM LACTATE AND CALCIUM CHLORIDE: 600; 310; 30; 20 INJECTION, SOLUTION INTRAVENOUS at 09:20

## 2019-06-25 RX ADMIN — FENTANYL CITRATE 25 MCG: 50 INJECTION, SOLUTION INTRAMUSCULAR; INTRAVENOUS at 10:35

## 2019-06-25 RX ADMIN — FENTANYL CITRATE 25 MCG: 50 INJECTION, SOLUTION INTRAMUSCULAR; INTRAVENOUS at 10:20

## 2019-06-25 RX ADMIN — FENTANYL CITRATE 25 MCG: 50 INJECTION, SOLUTION INTRAMUSCULAR; INTRAVENOUS at 10:16

## 2019-06-25 RX ADMIN — FENTANYL CITRATE 25 MCG: 50 INJECTION, SOLUTION INTRAMUSCULAR; INTRAVENOUS at 10:25

## 2019-06-25 ASSESSMENT — PULMONARY FUNCTION TESTS
PIF_VALUE: 0

## 2019-06-25 ASSESSMENT — PAIN - FUNCTIONAL ASSESSMENT: PAIN_FUNCTIONAL_ASSESSMENT: 0-10

## 2019-06-25 ASSESSMENT — PAIN SCALES - GENERAL
PAINLEVEL_OUTOF10: 0

## 2019-06-25 ASSESSMENT — PAIN DESCRIPTION - DESCRIPTORS: DESCRIPTORS: ACHING;DISCOMFORT

## 2019-06-25 NOTE — OP NOTE
with Band-Aid. Disposition the patient tolerated the procedure well and there were no complications . Vital signs remained stable throughout the procedure. The patient was escorted to the recovery area where they remained until discharge and written discharge instructions for the procedure were given. Patient had excellent pain relief immediately after the procedure. Plan: [facility] Santana will return to our pain management center as scheduled.      Aziza Andrade MD

## 2019-06-25 NOTE — H&P
Diabetes Mother     Hypertension Mother     Hypertension Father     Heart Failure Father     Diabetes Sister     Diabetes Sister     Hypertension Sister     Arthritis Other          REVIEW OF SYSTEMS:    CONSTITUTIONAL:  negative for  fevers, chills, sweats and fatigue    RESPIRATORY:  negative for  dry cough, cough with sputum, dyspnea, wheezing and chest pain    CARDIOVASCULAR:  negative for chest pain, dyspnea, palpitations, syncope    GASTROINTESTINAL:  negative for nausea, vomiting, change in bowel habits, diarrhea, constipation and abdominal pain    MUSCULOSKELETAL: negative for muscle weakness    SKIN: negative for itching or rashes. BEHAVIOR/PSYCH:  negative for poor appetite, increased appetite, decreased sleep and poor concentration    All other systems negative      PHYSICAL EXAM:    VITALS:  BP (!) 148/58   Pulse 78   Temp 98 °F (36.7 °C)   Resp 16   Ht 4' 11\" (1.499 m)   Wt 146 lb (66.2 kg)   SpO2 94%   BMI 29.49 kg/m²     CONSTITUTIONAL:  awake, alert, cooperative, no apparent distress, and appears stated age    EYES: PERRLA, EOMI    LUNGS:  No increased work of breathing, no audible wheezing    CARDIOVASCULAR:  regular rate and rhythm    ABDOMEN:  Soft non tender non distended     EXTREMITIES: no signs of clubbing or cyanosis. MUSCULOSKELETAL: negative for flaccid muscle tone or spastic movements. SKIN: gross examination reveals no signs of rashes, or diaphoresis. NEURO: Cranial nerves II-XII grossly intact. No signs of agitated mood.        Assessment/Plan:    Patient  is here for suprascapular nerve block for shoulder pain    Rosaline Chan MD

## 2019-07-31 ENCOUNTER — OFFICE VISIT (OUTPATIENT)
Dept: PAIN MANAGEMENT | Age: 84
End: 2019-07-31
Payer: MEDICARE

## 2019-07-31 VITALS
DIASTOLIC BLOOD PRESSURE: 58 MMHG | HEART RATE: 61 BPM | SYSTOLIC BLOOD PRESSURE: 120 MMHG | HEIGHT: 59 IN | TEMPERATURE: 98.3 F | OXYGEN SATURATION: 92 % | BODY MASS INDEX: 29.23 KG/M2 | WEIGHT: 145 LBS | RESPIRATION RATE: 18 BRPM

## 2019-07-31 DIAGNOSIS — M19.012 OSTEOARTHRITIS OF BOTH SHOULDERS, UNSPECIFIED OSTEOARTHRITIS TYPE: Primary | Chronic | ICD-10-CM

## 2019-07-31 DIAGNOSIS — M25.511 PAIN OF BOTH SHOULDER JOINTS: ICD-10-CM

## 2019-07-31 DIAGNOSIS — M47.812 CERVICAL FACET SYNDROME: Chronic | ICD-10-CM

## 2019-07-31 DIAGNOSIS — M19.011 OSTEOARTHRITIS OF BOTH SHOULDERS, UNSPECIFIED OSTEOARTHRITIS TYPE: Primary | Chronic | ICD-10-CM

## 2019-07-31 DIAGNOSIS — M51.36 DDD (DEGENERATIVE DISC DISEASE), LUMBAR: ICD-10-CM

## 2019-07-31 DIAGNOSIS — M25.511 CHRONIC PAIN OF BOTH SHOULDERS: ICD-10-CM

## 2019-07-31 DIAGNOSIS — M47.817 LUMBOSACRAL SPONDYLOSIS WITHOUT MYELOPATHY: ICD-10-CM

## 2019-07-31 DIAGNOSIS — G89.29 CHRONIC PAIN OF BOTH SHOULDERS: ICD-10-CM

## 2019-07-31 DIAGNOSIS — M25.512 CHRONIC PAIN OF BOTH SHOULDERS: ICD-10-CM

## 2019-07-31 DIAGNOSIS — M25.512 PAIN OF BOTH SHOULDER JOINTS: ICD-10-CM

## 2019-07-31 DIAGNOSIS — M50.30 DDD (DEGENERATIVE DISC DISEASE), CERVICAL: Chronic | ICD-10-CM

## 2019-07-31 PROCEDURE — 99213 OFFICE O/P EST LOW 20 MIN: CPT | Performed by: ANESTHESIOLOGY

## 2019-07-31 RX ORDER — ALPRAZOLAM 0.25 MG/1
TABLET ORAL
COMMUNITY
End: 2019-07-31

## 2019-07-31 RX ORDER — HYDROCODONE BITARTRATE AND ACETAMINOPHEN 7.5; 325 MG/1; MG/1
TABLET ORAL
COMMUNITY

## 2019-07-31 NOTE — PROGRESS NOTES
Billy Raza presents to the [facility] on 7/31/2019. Latasha Hamilton is complaining of pain in her shoulders. The pain is constant. The pain is described as aching, throbbing and dull. Pain is rated on her best day at a 2, on her worst day at a 5, and on average at a 3 on the VAS scale. She took her last dose of Norco this morning. Any procedures since your last visit: Yes, with 75 % relief. PROCEDURE: Bilateral suprascapular nerve block under Ultrasound guidanceguidance.       She has been on anticoagulation medications to include ASA and is managed by PCP. Pacemaker or defibrilator: No Physician managing device is .       BP (!) 120/58   Pulse 61   Temp 98.3 °F (36.8 °C)   Resp 18   Ht 4' 11\" (1.499 m)   Wt 145 lb (65.8 kg)   SpO2 92%   BMI 29.29 kg/m²      No LMP recorded.  Patient is postmenopausal.
normal. The humerus is normal in contour. The radius and ulna are   also within normal limits without evidence of fracture.               Impression       Right shoulder:   1.  No acute findings.       Right humerus:   1.  No acute findings.       Right radius and ulna:   1.  No acute findings.         CT cervical spine:  2016:      There is normal alignment of the cervical spine.  The vertebral bodies   are normal in height and alignment.  There is no evidence of fracture   or dislocation. Potential Aberrant Drug-Related Behavior: no     Urine Drug Screening: no    OARRS report[de-identified] yes today. 7/31/2019    Past Medical History:   Diagnosis Date    Arthritis     CAD (coronary artery disease)     Hyperlipidemia     Hypertension     Latex allergy     My[facility]) 2012    Shoulder pain        Past Surgical History:   Procedure Laterality Date    ANESTHESIA NERVE BLOCK Bilateral 6/25/2019    BILATERAL SUPRASCAPULAR BLOCK UNDER ULTRASOUND  (CPT: 45649) performed by Fabiano Garner MD at [address] [place] [address]  june 2012   [address]  june 2012    triple bypass    CARPAL TUNNEL RELEASE      CARPAL TUNNEL RELEASE Right 04/25/2018    CORONARY ARTERY BYPASS GRAFT  2012    NERVE BLOCK  11/24/14    bilateral shoulder injection #1    NERVE BLOCK Bilateral 12/1/2014    alexys shoulder injections  #2    NERVE BLOCK N/A 1/7/2015    cervical epidural  #1    NERVE BLOCK N/A 1 21 15    cerv ep #2    NERVE BLOCK Bilateral 01/23/2017    Bilateral shoulder injection #1    NERVE BLOCK Bilateral 06/25/2019    suprascapular block with sedation    [place] REVISE MEDIAN N/CARPAL TUNNEL SURG Right 4/25/2018    RIGHT HAND CARPAL TUNNEL RELEASE performed by Sanjuanita Zendejas MD at [address] [place] [address]       Prior to Admission medications    Medication Sig Start Date End Date Taking?  Authorizing Provider   HYDROcodone-acetaminophen (NORCO) 7.5-325 MG per tablet hydrocodone 7.5

## 2019-09-04 ENCOUNTER — OFFICE VISIT (OUTPATIENT)
Dept: PAIN MANAGEMENT | Age: 84
End: 2019-09-04
Payer: MEDICARE

## 2019-09-04 ENCOUNTER — PREP FOR PROCEDURE (OUTPATIENT)
Dept: PAIN MANAGEMENT | Age: 84
End: 2019-09-04

## 2019-09-04 VITALS
HEART RATE: 68 BPM | SYSTOLIC BLOOD PRESSURE: 128 MMHG | HEIGHT: 59 IN | DIASTOLIC BLOOD PRESSURE: 64 MMHG | RESPIRATION RATE: 16 BRPM | TEMPERATURE: 98.6 F | WEIGHT: 145 LBS | OXYGEN SATURATION: 94 % | BODY MASS INDEX: 29.23 KG/M2

## 2019-09-04 DIAGNOSIS — M25.512 PAIN OF BOTH SHOULDER JOINTS: ICD-10-CM

## 2019-09-04 DIAGNOSIS — M19.011 OSTEOARTHRITIS OF BOTH SHOULDERS, UNSPECIFIED OSTEOARTHRITIS TYPE: Primary | Chronic | ICD-10-CM

## 2019-09-04 DIAGNOSIS — M25.511 PAIN OF BOTH SHOULDER JOINTS: ICD-10-CM

## 2019-09-04 DIAGNOSIS — M19.012 OSTEOARTHRITIS OF BOTH SHOULDERS, UNSPECIFIED OSTEOARTHRITIS TYPE: Primary | Chronic | ICD-10-CM

## 2019-09-04 DIAGNOSIS — F11.90 CHRONIC, CONTINUOUS USE OF OPIOIDS: ICD-10-CM

## 2019-09-04 PROCEDURE — 99214 OFFICE O/P EST MOD 30 MIN: CPT | Performed by: ANESTHESIOLOGY

## 2019-09-04 PROCEDURE — 99213 OFFICE O/P EST LOW 20 MIN: CPT | Performed by: ANESTHESIOLOGY

## 2019-09-04 NOTE — PROGRESS NOTES
helps with pain and functionality, no SE's, no signs of misuse abuse or diversion. It is appropriate to continue the pain meds for pain relief and functional improvement. She can continue it from her PCP. Try stretching exercise.       Krunal Perez MD      CC:    Pola Reddy MD  [facility], [facility]

## 2019-09-28 ENCOUNTER — ANESTHESIA EVENT (OUTPATIENT)
Dept: OPERATING ROOM | Age: 84
End: 2019-09-28
Payer: MEDICARE

## 2019-09-28 ASSESSMENT — LIFESTYLE VARIABLES: SMOKING_STATUS: 0

## 2019-10-01 ENCOUNTER — ANESTHESIA (OUTPATIENT)
Dept: OPERATING ROOM | Age: 84
End: 2019-10-01
Payer: MEDICARE

## 2019-10-01 ENCOUNTER — HOSPITAL ENCOUNTER (OUTPATIENT)
Age: 84
Setting detail: OUTPATIENT SURGERY
Discharge: HOME | End: 2019-10-01
Attending: ANESTHESIOLOGY | Admitting: ANESTHESIOLOGY
Payer: MEDICARE

## 2019-10-01 VITALS
DIASTOLIC BLOOD PRESSURE: 40 MMHG | OXYGEN SATURATION: 96 % | TEMPERATURE: 98 F | HEIGHT: 59 IN | HEART RATE: 85 BPM | WEIGHT: 145 LBS | RESPIRATION RATE: 16 BRPM | BODY MASS INDEX: 29.23 KG/M2 | SYSTOLIC BLOOD PRESSURE: 155 MMHG

## 2019-10-01 VITALS
DIASTOLIC BLOOD PRESSURE: 58 MMHG | SYSTOLIC BLOOD PRESSURE: 167 MMHG | RESPIRATION RATE: 26 BRPM | OXYGEN SATURATION: 98 %

## 2019-10-01 PROCEDURE — 7100000011 HC PHASE II RECOVERY - ADDTL 15 MIN: Performed by: ANESTHESIOLOGY

## 2019-10-01 PROCEDURE — 2709999900 HC NON-CHARGEABLE SUPPLY: Performed by: ANESTHESIOLOGY

## 2019-10-01 PROCEDURE — 3600000005 HC SURGERY LEVEL 5 BASE: Performed by: ANESTHESIOLOGY

## 2019-10-01 PROCEDURE — 6360000002 HC RX W HCPCS: Performed by: NURSE ANESTHETIST, CERTIFIED REGISTERED

## 2019-10-01 PROCEDURE — 3600000015 HC SURGERY LEVEL 5 ADDTL 15MIN: Performed by: ANESTHESIOLOGY

## 2019-10-01 PROCEDURE — 64418 NJX AA&/STRD SPRSCAP NRV: CPT | Performed by: ANESTHESIOLOGY

## 2019-10-01 PROCEDURE — 2500000003 HC RX 250 WO HCPCS: Performed by: ANESTHESIOLOGY

## 2019-10-01 PROCEDURE — 2580000003 HC RX 258: Performed by: ANESTHESIOLOGY

## 2019-10-01 PROCEDURE — 7100000010 HC PHASE II RECOVERY - FIRST 15 MIN: Performed by: ANESTHESIOLOGY

## 2019-10-01 PROCEDURE — 6360000002 HC RX W HCPCS: Performed by: ANESTHESIOLOGY

## 2019-10-01 PROCEDURE — 76942 ECHO GUIDE FOR BIOPSY: CPT | Performed by: ANESTHESIOLOGY

## 2019-10-01 PROCEDURE — 76942 ECHO GUIDE FOR BIOPSY: CPT

## 2019-10-01 PROCEDURE — 3700000001 HC ADD 15 MINUTES (ANESTHESIA): Performed by: ANESTHESIOLOGY

## 2019-10-01 PROCEDURE — 3700000000 HC ANESTHESIA ATTENDED CARE: Performed by: ANESTHESIOLOGY

## 2019-10-01 RX ORDER — FENTANYL CITRATE 50 UG/ML
INJECTION, SOLUTION INTRAMUSCULAR; INTRAVENOUS PRN
Status: DISCONTINUED | OUTPATIENT
Start: 2019-10-01 | End: 2019-10-01 | Stop reason: SDUPTHER

## 2019-10-01 RX ORDER — SODIUM CHLORIDE, SODIUM LACTATE, POTASSIUM CHLORIDE, CALCIUM CHLORIDE 600; 310; 30; 20 MG/100ML; MG/100ML; MG/100ML; MG/100ML
INJECTION, SOLUTION INTRAVENOUS CONTINUOUS
Status: DISCONTINUED | OUTPATIENT
Start: 2019-10-01 | End: 2019-10-01 | Stop reason: HOSPADM

## 2019-10-01 RX ORDER — LIDOCAINE HYDROCHLORIDE 5 MG/ML
INJECTION, SOLUTION INFILTRATION; INTRAVENOUS PRN
Status: DISCONTINUED | OUTPATIENT
Start: 2019-10-01 | End: 2019-10-01 | Stop reason: ALTCHOICE

## 2019-10-01 RX ADMIN — FENTANYL CITRATE 25 MCG: 50 INJECTION, SOLUTION INTRAMUSCULAR; INTRAVENOUS at 11:49

## 2019-10-01 RX ADMIN — SODIUM CHLORIDE, POTASSIUM CHLORIDE, SODIUM LACTATE AND CALCIUM CHLORIDE: 600; 310; 30; 20 INJECTION, SOLUTION INTRAVENOUS at 09:51

## 2019-10-01 RX ADMIN — FENTANYL CITRATE 50 MCG: 50 INJECTION, SOLUTION INTRAMUSCULAR; INTRAVENOUS at 11:39

## 2019-10-01 ASSESSMENT — PAIN DESCRIPTION - DESCRIPTORS: DESCRIPTORS: DISCOMFORT

## 2019-10-01 ASSESSMENT — PULMONARY FUNCTION TESTS
PIF_VALUE: 0

## 2019-10-01 ASSESSMENT — PAIN - FUNCTIONAL ASSESSMENT: PAIN_FUNCTIONAL_ASSESSMENT: 0-10

## 2019-10-01 ASSESSMENT — PAIN SCALES - GENERAL
PAINLEVEL_OUTOF10: 0

## 2019-10-07 ENCOUNTER — TELEPHONE (OUTPATIENT)
Dept: PAIN MANAGEMENT | Age: 84
End: 2019-10-07

## 2019-10-22 ENCOUNTER — TELEPHONE (OUTPATIENT)
Dept: PAIN MANAGEMENT | Age: 84
End: 2019-10-22

## 2019-10-23 ENCOUNTER — OFFICE VISIT (OUTPATIENT)
Dept: PAIN MANAGEMENT | Age: 84
End: 2019-10-23
Payer: MEDICARE

## 2019-10-23 VITALS
RESPIRATION RATE: 18 BRPM | SYSTOLIC BLOOD PRESSURE: 120 MMHG | HEIGHT: 59 IN | HEART RATE: 56 BPM | DIASTOLIC BLOOD PRESSURE: 58 MMHG | OXYGEN SATURATION: 95 % | TEMPERATURE: 98.3 F | WEIGHT: 145 LBS | BODY MASS INDEX: 29.23 KG/M2

## 2019-10-23 DIAGNOSIS — M19.012 OSTEOARTHRITIS OF BOTH SHOULDERS, UNSPECIFIED OSTEOARTHRITIS TYPE: Primary | Chronic | ICD-10-CM

## 2019-10-23 DIAGNOSIS — M25.512 CHRONIC PAIN OF BOTH SHOULDERS: ICD-10-CM

## 2019-10-23 DIAGNOSIS — G89.29 CHRONIC PAIN OF BOTH SHOULDERS: ICD-10-CM

## 2019-10-23 DIAGNOSIS — M25.512 PAIN OF BOTH SHOULDER JOINTS: ICD-10-CM

## 2019-10-23 DIAGNOSIS — M25.511 CHRONIC PAIN OF BOTH SHOULDERS: ICD-10-CM

## 2019-10-23 DIAGNOSIS — M25.511 PAIN OF BOTH SHOULDER JOINTS: ICD-10-CM

## 2019-10-23 DIAGNOSIS — M54.2 CERVICALGIA: ICD-10-CM

## 2019-10-23 DIAGNOSIS — M48.061 SPINAL STENOSIS OF LUMBAR REGION, UNSPECIFIED WHETHER NEUROGENIC CLAUDICATION PRESENT: ICD-10-CM

## 2019-10-23 DIAGNOSIS — M19.011 OSTEOARTHRITIS OF BOTH SHOULDERS, UNSPECIFIED OSTEOARTHRITIS TYPE: Primary | Chronic | ICD-10-CM

## 2019-10-23 DIAGNOSIS — F11.90 CHRONIC, CONTINUOUS USE OF OPIOIDS: ICD-10-CM

## 2019-10-23 PROCEDURE — 99214 OFFICE O/P EST MOD 30 MIN: CPT | Performed by: ANESTHESIOLOGY

## 2019-10-23 PROCEDURE — 1036F TOBACCO NON-USER: CPT | Performed by: ANESTHESIOLOGY

## 2019-10-23 PROCEDURE — G8400 PT W/DXA NO RESULTS DOC: HCPCS | Performed by: ANESTHESIOLOGY

## 2019-10-23 PROCEDURE — G8427 DOCREV CUR MEDS BY ELIG CLIN: HCPCS | Performed by: ANESTHESIOLOGY

## 2019-10-23 PROCEDURE — 1123F ACP DISCUSS/DSCN MKR DOCD: CPT | Performed by: ANESTHESIOLOGY

## 2019-10-23 PROCEDURE — 99213 OFFICE O/P EST LOW 20 MIN: CPT | Performed by: ANESTHESIOLOGY

## 2019-10-23 PROCEDURE — G8417 CALC BMI ABV UP PARAM F/U: HCPCS | Performed by: ANESTHESIOLOGY

## 2019-10-23 PROCEDURE — G8484 FLU IMMUNIZE NO ADMIN: HCPCS | Performed by: ANESTHESIOLOGY

## 2019-10-23 PROCEDURE — 1090F PRES/ABSN URINE INCON ASSESS: CPT | Performed by: ANESTHESIOLOGY

## 2019-10-23 PROCEDURE — 4040F PNEUMOC VAC/ADMIN/RCVD: CPT | Performed by: ANESTHESIOLOGY

## 2019-10-23 RX ORDER — LISINOPRIL 10 MG/1
TABLET ORAL
Refills: 3 | COMMUNITY
Start: 2019-09-23 | End: 2019-10-23

## 2019-10-23 RX ORDER — ATORVASTATIN CALCIUM 20 MG/1
TABLET, FILM COATED ORAL
Refills: 3 | COMMUNITY
Start: 2019-09-01

## 2019-10-23 RX ORDER — DULOXETIN HYDROCHLORIDE 30 MG/1
30 CAPSULE, DELAYED RELEASE ORAL DAILY
Qty: 30 CAPSULE | Refills: 1 | Status: SHIPPED
Start: 2019-10-23 | End: 2020-03-12 | Stop reason: ALTCHOICE

## 2020-02-14 ENCOUNTER — OFFICE VISIT (OUTPATIENT)
Dept: PAIN MANAGEMENT | Age: 85
End: 2020-02-14
Payer: MEDICARE

## 2020-02-14 ENCOUNTER — PREP FOR PROCEDURE (OUTPATIENT)
Dept: PAIN MANAGEMENT | Age: 85
End: 2020-02-14

## 2020-02-14 VITALS
WEIGHT: 145 LBS | SYSTOLIC BLOOD PRESSURE: 126 MMHG | RESPIRATION RATE: 16 BRPM | HEART RATE: 64 BPM | BODY MASS INDEX: 29.23 KG/M2 | HEIGHT: 59 IN | DIASTOLIC BLOOD PRESSURE: 58 MMHG | TEMPERATURE: 98.5 F

## 2020-02-14 PROCEDURE — 4040F PNEUMOC VAC/ADMIN/RCVD: CPT | Performed by: ANESTHESIOLOGY

## 2020-02-14 PROCEDURE — 1036F TOBACCO NON-USER: CPT | Performed by: ANESTHESIOLOGY

## 2020-02-14 PROCEDURE — 1090F PRES/ABSN URINE INCON ASSESS: CPT | Performed by: ANESTHESIOLOGY

## 2020-02-14 PROCEDURE — 99214 OFFICE O/P EST MOD 30 MIN: CPT | Performed by: ANESTHESIOLOGY

## 2020-02-14 PROCEDURE — G8417 CALC BMI ABV UP PARAM F/U: HCPCS | Performed by: ANESTHESIOLOGY

## 2020-02-14 PROCEDURE — 1123F ACP DISCUSS/DSCN MKR DOCD: CPT | Performed by: ANESTHESIOLOGY

## 2020-02-14 PROCEDURE — G8400 PT W/DXA NO RESULTS DOC: HCPCS | Performed by: ANESTHESIOLOGY

## 2020-02-14 PROCEDURE — G8427 DOCREV CUR MEDS BY ELIG CLIN: HCPCS | Performed by: ANESTHESIOLOGY

## 2020-02-14 PROCEDURE — 99213 OFFICE O/P EST LOW 20 MIN: CPT | Performed by: ANESTHESIOLOGY

## 2020-02-14 PROCEDURE — G8484 FLU IMMUNIZE NO ADMIN: HCPCS | Performed by: ANESTHESIOLOGY

## 2020-02-14 NOTE — PROGRESS NOTES
TECHNIQUE: 2 views right shoulder, 2 views right humerus and 2 views   right radius/ulna       FINDINGS: Severe glenohumeral arthritis identified. Shoulder alignment   is normal. The humerus is normal in contour. The radius and ulna are   also within normal limits without evidence of fracture.               Impression       Right shoulder:   1.  No acute findings.       Right humerus:   1.  No acute findings.       Right radius and ulna:   1.  No acute findings.         CT cervical spine:  2016:      There is normal alignment of the cervical spine.  The vertebral bodies   are normal in height and alignment.  There is no evidence of fracture   or dislocation. Potential Aberrant Drug-Related Behavior: no     Urine Drug Screening: no    OARRS report[de-identified] yes   7/31/2019 consistent  9/4/2019 consistent  10/23/2019: consistent  Reviewed consistent.     Past Medical History:   Diagnosis Date    Arthritis     CAD (coronary artery disease)     Hyperlipidemia     Hypertension     Latex allergy     Myocardial infarction [facility]) 2012    Shoulder pain        Past Surgical History:   Procedure Laterality Date    ANESTHESIA NERVE BLOCK Bilateral 6/25/2019    BILATERAL SUPRASCAPULAR BLOCK UNDER ULTRASOUND  (CPT: 75856) performed by Lamonte Lang MD at [facility] Bilateral 10/1/2019    BILATERAL SUPRASCAPUER BLOCK UNDER ULTRASOUND GUIDANCE performed by Lamonte Lang MD at [address] [place] [address] Avenue  june 2012   [address]GERY  june 2012    triple bypass    CARPAL TUNNEL RELEASE      CARPAL TUNNEL RELEASE Right 04/25/2018    CORONARY ARTERY BYPASS GRAFT  2012    NERVE BLOCK  11/24/14    bilateral shoulder injection #1    NERVE BLOCK Bilateral 12/1/2014    alexys shoulder injections  #2    NERVE BLOCK N/A 1/7/2015    cervical epidural  #1    NERVE BLOCK N/A 1 21 15    cerv ep #2    NERVE BLOCK Bilateral 01/23/2017    Bilateral shoulder injection #1  NERVE BLOCK Bilateral 06/25/2019    suprascapular block with sedation    NERVE BLOCK Bilateral 10/01/2019    suprascapular     [place] REVISE MEDIAN N/CARPAL TUNNEL SURG Right 4/25/2018    RIGHT HAND CARPAL TUNNEL RELEASE performed by Ariel Concepcion MD at [address] [place] [address]       Prior to Admission medications    Medication Sig Start Date End Date Taking? Authorizing Provider   atorvastatin (LIPITOR) 20 MG tablet TAKE ONE TABLET BY MOUTH EVERY DAY FOR CHOLESTEROL 9/1/19  Yes Historical Provider, MD   HYDROcodone-acetaminophen (Bobby Bolls) 7.5-325 MG per tablet hydrocodone 7.5 mg-acetaminophen 325 mg tablet   Yes Historical Provider, MD   diphenhydrAMINE (BENADRYL) 25 MG capsule Take 25 mg by mouth every 6 hours as needed for Itching   Yes Historical Provider, MD   aspirin 81 MG tablet Take 81 mg by mouth daily Stopped 4 days pre op   Yes Historical Provider, MD   amLODIPine (NORVASC) 10 MG tablet Take 10 mg by mouth daily    Yes Historical Provider, MD   metoprolol (LOPRESSOR) 25 MG tablet Take 12.5 mg by mouth 2 times daily. Yes Historical Provider, MD   LISINOPRIL PO Take 10 mg by mouth 2 times daily. Yes Historical Provider, MD   DULoxetine (CYMBALTA) 30 MG extended release capsule Take 1 capsule by mouth daily  Patient not taking: Reported on 2/14/2020 10/23/19   Edin Fierro MD       Allergies   Allergen Reactions    Latex Rash     contact    Adhesive Tape     Glimepiride      Itching--9/2013    Tetanus Toxoids Swelling       Social History     Socioeconomic History    Marital status:       Spouse name: Not on file    Number of children: Not on file    Years of education: Not on file    Highest education level: Not on file   Occupational History    Not on file   Social Needs    Financial resource strain: Not on file    Food insecurity:     Worry: Not on file     Inability: Not on file    Transportation needs:     Medical: Not on file     Non-medical: Not on file   Tobacco Use    Smoking spine:     Inspection:normal  Palpation: no tenderness paravertebral muscles, tenderness trapezium, left, right and positive. Range of motion:reduced flexion, extension, rotation bilaterally and is painful. Spurling's: negative bilaterally     Thoracic spine:                Spine inspection:normal   Palpation:No tenderness over the midline and paraspinal area, bilaterally  Range of motion:normal in flexion, extension rotation bilateral and is not painful.     Lumbar spine:     Spine inspection: Normal   Palpation: Tenderness paravertebral muscles No bilaterally  Range of motion: Decreased, flexion Decreased, Lateral bending, extension and rotation bilaterally reduced is painful. Sacroiliac joint tenderness No bilaterally  ELINA test: negative bilaterally  Gaenslen's test:negative bilaterally   Piriformis tenderness: negative bilaterally  SLR : negative bilaterally  Trochanteric bursa tenderness: negative bilaterally  CVA tenderness: No       Musculoskeletal:     Trigger points in trapezius: No bilaterally  Trigger points in rhomboids: No bilaterally  Trigger points in Paravertebral: No bilaterally      Extremities:     Tremors:None bilaterally upper and lower  Varicose veins:absent      Shoulders:  Bilateral shoulder : ROM reduced, significant tenderness over the shoulder bilateral, significant pain on ROM, marked limited to ROM . Atrophy of the shoulder muscles noted.     Knee:     ROM : decreased  Joint line tenderness : minimal     Neurological:     Sensory: Normal to light touch      Motor:   Weakness of the bilateral shoulder muscles (limited by pain) otherwise UE muscle strength  Is equal     B/l LE motor power 4-5/5     Reflexes:    Bilaterally equal 1-2+     Gait: does not use assistive device.       Assessment/Plan:   Diagnosis Orders   1. Osteoarthritis of both shoulders, unspecified osteoarthritis type     2. Chronic pain of both shoulders     3. Chronic, continuous use of opioids     4.  Neuralgia and neuritis       H/o Bilateral shoulder pain due to OA of shoulder. S/p b/l suprascapular nerve block with USG guidance with excellent pain relief on the first time. Significant improvement In pain and quality of life. Patient was very happy with the outcome. Repeat procedure not much relief. Pain has recurred now. Will start on Cymbalta 30 mg po Q hs. Dosing and side effects explained. She had tried it at night time. Was anxious and had stopped. Recommend to trial at day time. If she notices side effects, to DC. Will schedule for b/l Suprascapular nerve Pulsed RFA under ultrasound guidance. RBA discussed. Will do procedure with moderate sedation. Takes Hydrocodone bid- tid given by her PCP which helps with pain and functionality, no SE's, no signs of misuse abuse or diversion. It is appropriate to continue the pain meds for pain relief and functional improvement. She can continue it from her PCP. Try stretching exercise. TENS unit- use more frequently. Prescribed Compound cream for local use. I gave a combination of diclofenac 3%, gabapentin 6%, baclofen 2%, lidocaine 5%, doxepin 5%. Use instructions, Dosing and side effects explained. Counseling regarding the proper medication use, importance of regular exercise program, detailed discussions about the planned procedure done.       Cuca Morin MD      CC:    Pedro Luis Negron MD  [facility] 06, [facility]

## 2020-02-17 PROBLEM — M79.2 NEURALGIA AND NEURITIS: Status: ACTIVE | Noted: 2020-02-17

## 2020-03-16 ENCOUNTER — ANESTHESIA EVENT (OUTPATIENT)
Dept: OPERATING ROOM | Age: 85
End: 2020-03-16
Payer: MEDICARE

## 2020-03-16 ASSESSMENT — LIFESTYLE VARIABLES: SMOKING_STATUS: 0

## 2020-03-16 NOTE — ANESTHESIA PRE PROCEDURE
Department of Anesthesiology  Preprocedure Note       Name:  Maribeth South   Age:  80 y.o.  :  1935                                          MRN:  [id number]         Date:  3/16/2020      Surgeon: Micaela Dean):  Veronica Bowles MD    Procedure: BILATERAL SUPRASCAPULAR NERVE RADIOFREQUENCY ABLATION WITH ULTRASOUND (CPT 65829 [id number]) SEDATION (Bilateral )    Medications prior to admission:   Prior to Admission medications    Medication Sig Start Date End Date Taking? Authorizing Provider   atorvastatin (LIPITOR) 20 MG tablet TAKE ONE TABLET BY MOUTH EVERY DAY FOR CHOLESTEROL 19  Yes Historical Provider, MD   HYDROcodone-acetaminophen ([address]) 7.5-325 MG per tablet hydrocodone 7.5 mg-acetaminophen 325 mg tablet   Yes Historical Provider, MD   diphenhydrAMINE (BENADRYL) 25 MG capsule Take 25 mg by mouth every 6 hours as needed for Itching   Yes Historical Provider, MD   aspirin 81 MG tablet Take 81 mg by mouth daily    Yes Historical Provider, MD   metoprolol (LOPRESSOR) 25 MG tablet Take 12.5 mg by mouth 2 times daily. Yes Historical Provider, MD   LISINOPRIL PO Take 10 mg by mouth 2 times daily. Yes Historical Provider, MD       Current medications:    No current facility-administered medications for this encounter. Current Outpatient Medications   Medication Sig Dispense Refill    atorvastatin (LIPITOR) 20 MG tablet TAKE ONE TABLET BY MOUTH EVERY DAY FOR CHOLESTEROL  3    HYDROcodone-acetaminophen (NORCO) 7.5-325 MG per tablet hydrocodone 7.5 mg-acetaminophen 325 mg tablet      diphenhydrAMINE (BENADRYL) 25 MG capsule Take 25 mg by mouth every 6 hours as needed for Itching      aspirin 81 MG tablet Take 81 mg by mouth daily       metoprolol (LOPRESSOR) 25 MG tablet Take 12.5 mg by mouth 2 times daily.  LISINOPRIL PO Take 10 mg by mouth 2 times daily. Allergies:     Allergies   Allergen Reactions    Latex Rash     contact    Adhesive Tape     Glimepiride Itching--9/2013    Tetanus Toxoids Swelling       Problem List:    Patient Active Problem List   Diagnosis Code    Osteoarthritis of both shoulders M19.011, M19.012    DDD (degenerative disc disease), cervical M50.30    Cervical facet syndrome M47.812    Somatic dysfunction bilateral shoulders M99.09    Subarachnoid hemorrhage (HCC) I60.9    Type 2 diabetes mellitus (HCC) E11.9    Periorbital ecchymosis of right eye S00. 11XA    Chronic pain of both shoulders M25.511, G89.29, M25.512    DDD (degenerative disc disease), lumbar M51.36    Lumbosacral spondylosis without myelopathy M47.817    Spinal stenosis of lumbar region M48.061    Pain of both shoulder joints M25.511, M25.512    Cervicalgia M54.2    Chronic, continuous use of opioids F11.90    Neuralgia and neuritis M79.2       Past Medical History:        Diagnosis Date    Arthritis     CAD (coronary artery disease)     2012    Hyperlipidemia     Hypertension     Latex allergy     Myocardial infarction [facility]) 2012    Shoulder pain        Past Surgical History:        Procedure Laterality Date    ANESTHESIA NERVE BLOCK Bilateral 6/25/2019    BILATERAL SUPRASCAPULAR BLOCK UNDER ULTRASOUND  (CPT: 29372) performed by Juliette Dawn MD at [address] [facility] [address] Bilateral 10/1/2019    BILATERAL [address] performed by Juliette Dawn MD at [address] [place] [address] Avenue  june 2012   [address]  june 2012    triple bypass    CARPAL TUNNEL RELEASE      CARPAL TUNNEL RELEASE Right 04/25/2018    CORONARY ARTERY BYPASS GRAFT  2012    NERVE BLOCK  11/24/14    bilateral shoulder injection #1    NERVE BLOCK Bilateral 12/1/2014    alexys shoulder injections  #2    NERVE BLOCK N/A 1/7/2015    cervical epidural  #1    NERVE BLOCK N/A 1 21 15    cerv ep #2    NERVE BLOCK Bilateral 01/23/2017    Bilateral shoulder injection #1    NERVE BLOCK Bilateral 06/25/2019 suprascapular block with sedation    NERVE BLOCK Bilateral 10/01/2019    suprascapular     [place] REVISE MEDIAN N/CARPAL TUNNEL SURG Right 4/25/2018    RIGHT HAND CARPAL TUNNEL RELEASE performed by Batsheva Mcintyre MD at [address] History:    Social History     Tobacco Use    Smoking status: Never Smoker    Smokeless tobacco: Never Used   Substance Use Topics    Alcohol use: Not Currently     Drinks per session: 1 or 2     Binge frequency: Weekly                                Counseling given: Not Answered      Vital Signs (Current):   Vitals:    03/12/20 1253   Weight: 145 lb (65.8 kg)   Height: 4' 11\" (1.499 m)                                              BP Readings from Last 3 Encounters:   02/14/20 (!) 126/58   10/23/19 (!) 120/58   10/01/19 (!) 167/58       NPO Status:  >8.H                                                                               BMI:   Wt Readings from Last 3 Encounters:   02/14/20 145 lb (65.8 kg)   10/23/19 145 lb (65.8 kg)   10/01/19 145 lb (65.8 kg)     Body mass index is 29.29 kg/m². CBC:   Lab Results   Component Value Date    WBC 8.2 07/12/2018    RBC 4.16 07/12/2018    HGB 13.3 07/12/2018    HCT 38.0 07/12/2018    MCV 91.3 07/12/2018    RDW 12.4 07/12/2018     07/12/2018       CMP:   Lab Results   Component Value Date     07/12/2018    K 3.5 07/12/2018     07/12/2018    CO2 21 07/12/2018    BUN 7 07/12/2018    CREATININE 0.3 07/12/2018    GFRAA >60 07/12/2018    LABGLOM >60 07/12/2018    GLUCOSE 130 07/12/2018    PROT 6.4 09/01/2016    CALCIUM 9.6 07/12/2018    BILITOT 0.9 09/01/2016    ALKPHOS 83 09/01/2016    AST 42 09/01/2016    ALT 43 09/01/2016       POC Tests: No results for input(s): POCGLU, POCNA, POCK, POCCL, POCBUN, POCHEMO, POCHCT in the last 72 hours.     Coags:   Lab Results   Component Value Date    PROTIME 10.8 09/01/2016    INR 1.0 09/01/2016    APTT 26.2 09/01/2016       HCG (If Applicable): No results found for: PREGTESTUR,

## 2020-03-17 ENCOUNTER — ANESTHESIA (OUTPATIENT)
Dept: OPERATING ROOM | Age: 85
End: 2020-03-17
Payer: MEDICARE

## 2020-03-17 ENCOUNTER — HOSPITAL ENCOUNTER (OUTPATIENT)
Age: 85
Setting detail: OUTPATIENT SURGERY
Discharge: HOME | End: 2020-03-17
Attending: ANESTHESIOLOGY | Admitting: ANESTHESIOLOGY
Payer: MEDICARE

## 2020-03-17 VITALS
TEMPERATURE: 98 F | OXYGEN SATURATION: 98 % | RESPIRATION RATE: 20 BRPM | DIASTOLIC BLOOD PRESSURE: 88 MMHG | WEIGHT: 138 LBS | HEART RATE: 84 BPM | HEIGHT: 59 IN | SYSTOLIC BLOOD PRESSURE: 154 MMHG | BODY MASS INDEX: 27.82 KG/M2

## 2020-03-17 VITALS
RESPIRATION RATE: 14 BRPM | TEMPERATURE: 98.6 F | OXYGEN SATURATION: 100 % | DIASTOLIC BLOOD PRESSURE: 78 MMHG | SYSTOLIC BLOOD PRESSURE: 222 MMHG

## 2020-03-17 PROCEDURE — 64640 INJECTION TREATMENT OF NERVE: CPT | Performed by: ANESTHESIOLOGY

## 2020-03-17 PROCEDURE — 2709999900 HC NON-CHARGEABLE SUPPLY: Performed by: ANESTHESIOLOGY

## 2020-03-17 PROCEDURE — 3700000000 HC ANESTHESIA ATTENDED CARE: Performed by: ANESTHESIOLOGY

## 2020-03-17 PROCEDURE — 76942 ECHO GUIDE FOR BIOPSY: CPT | Performed by: ANESTHESIOLOGY

## 2020-03-17 PROCEDURE — 7100000010 HC PHASE II RECOVERY - FIRST 15 MIN: Performed by: ANESTHESIOLOGY

## 2020-03-17 PROCEDURE — 2500000003 HC RX 250 WO HCPCS: Performed by: ANESTHESIOLOGY

## 2020-03-17 PROCEDURE — 6360000002 HC RX W HCPCS: Performed by: ANESTHESIOLOGY

## 2020-03-17 PROCEDURE — 6360000002 HC RX W HCPCS: Performed by: NURSE ANESTHETIST, CERTIFIED REGISTERED

## 2020-03-17 PROCEDURE — 7100000011 HC PHASE II RECOVERY - ADDTL 15 MIN: Performed by: ANESTHESIOLOGY

## 2020-03-17 PROCEDURE — C1713 ANCHOR/SCREW BN/BN,TIS/BN: HCPCS | Performed by: ANESTHESIOLOGY

## 2020-03-17 PROCEDURE — 3700000001 HC ADD 15 MINUTES (ANESTHESIA): Performed by: ANESTHESIOLOGY

## 2020-03-17 PROCEDURE — 3600000005 HC SURGERY LEVEL 5 BASE: Performed by: ANESTHESIOLOGY

## 2020-03-17 PROCEDURE — 2580000003 HC RX 258: Performed by: ANESTHESIOLOGY

## 2020-03-17 PROCEDURE — 3600000015 HC SURGERY LEVEL 5 ADDTL 15MIN: Performed by: ANESTHESIOLOGY

## 2020-03-17 RX ORDER — HYDROCODONE BITARTRATE AND ACETAMINOPHEN 5; 325 MG/1; MG/1
2 TABLET ORAL PRN
Status: DISCONTINUED | OUTPATIENT
Start: 2020-03-17 | End: 2020-03-17 | Stop reason: HOSPADM

## 2020-03-17 RX ORDER — BUPIVACAINE HYDROCHLORIDE 2.5 MG/ML
INJECTION, SOLUTION EPIDURAL; INFILTRATION; INTRACAUDAL PRN
Status: DISCONTINUED | OUTPATIENT
Start: 2020-03-17 | End: 2020-03-17 | Stop reason: ALTCHOICE

## 2020-03-17 RX ORDER — MIDAZOLAM HYDROCHLORIDE 1 MG/ML
INJECTION INTRAMUSCULAR; INTRAVENOUS PRN
Status: DISCONTINUED | OUTPATIENT
Start: 2020-03-17 | End: 2020-03-17 | Stop reason: SDUPTHER

## 2020-03-17 RX ORDER — FUROSEMIDE 20 MG/1
20 TABLET ORAL 2 TIMES DAILY
COMMUNITY

## 2020-03-17 RX ORDER — DIPHENHYDRAMINE HYDROCHLORIDE 50 MG/ML
12.5 INJECTION INTRAMUSCULAR; INTRAVENOUS
Status: DISCONTINUED | OUTPATIENT
Start: 2020-03-17 | End: 2020-03-17 | Stop reason: HOSPADM

## 2020-03-17 RX ORDER — HYDROCODONE BITARTRATE AND ACETAMINOPHEN 5; 325 MG/1; MG/1
1 TABLET ORAL PRN
Status: DISCONTINUED | OUTPATIENT
Start: 2020-03-17 | End: 2020-03-17 | Stop reason: HOSPADM

## 2020-03-17 RX ORDER — MEPERIDINE HYDROCHLORIDE 50 MG/ML
12.5 INJECTION INTRAMUSCULAR; INTRAVENOUS; SUBCUTANEOUS EVERY 5 MIN PRN
Status: DISCONTINUED | OUTPATIENT
Start: 2020-03-17 | End: 2020-03-17 | Stop reason: HOSPADM

## 2020-03-17 RX ORDER — MORPHINE SULFATE 2 MG/ML
1 INJECTION, SOLUTION INTRAMUSCULAR; INTRAVENOUS EVERY 5 MIN PRN
Status: DISCONTINUED | OUTPATIENT
Start: 2020-03-17 | End: 2020-03-17 | Stop reason: HOSPADM

## 2020-03-17 RX ORDER — LIDOCAINE HYDROCHLORIDE 5 MG/ML
INJECTION, SOLUTION INFILTRATION; INTRAVENOUS PRN
Status: DISCONTINUED | OUTPATIENT
Start: 2020-03-17 | End: 2020-03-17 | Stop reason: ALTCHOICE

## 2020-03-17 RX ORDER — LABETALOL HYDROCHLORIDE 5 MG/ML
5 INJECTION, SOLUTION INTRAVENOUS EVERY 10 MIN PRN
Status: DISCONTINUED | OUTPATIENT
Start: 2020-03-17 | End: 2020-03-17 | Stop reason: HOSPADM

## 2020-03-17 RX ORDER — METHYLPREDNISOLONE ACETATE 40 MG/ML
INJECTION, SUSPENSION INTRA-ARTICULAR; INTRALESIONAL; INTRAMUSCULAR; SOFT TISSUE PRN
Status: DISCONTINUED | OUTPATIENT
Start: 2020-03-17 | End: 2020-03-17 | Stop reason: ALTCHOICE

## 2020-03-17 RX ORDER — PROMETHAZINE HYDROCHLORIDE 25 MG/ML
25 INJECTION, SOLUTION INTRAMUSCULAR; INTRAVENOUS
Status: DISCONTINUED | OUTPATIENT
Start: 2020-03-17 | End: 2020-03-17 | Stop reason: HOSPADM

## 2020-03-17 RX ORDER — FENTANYL CITRATE 50 UG/ML
50 INJECTION, SOLUTION INTRAMUSCULAR; INTRAVENOUS EVERY 5 MIN PRN
Status: DISCONTINUED | OUTPATIENT
Start: 2020-03-17 | End: 2020-03-17 | Stop reason: HOSPADM

## 2020-03-17 RX ORDER — HYDRALAZINE HYDROCHLORIDE 20 MG/ML
5 INJECTION INTRAMUSCULAR; INTRAVENOUS EVERY 10 MIN PRN
Status: DISCONTINUED | OUTPATIENT
Start: 2020-03-17 | End: 2020-03-17 | Stop reason: HOSPADM

## 2020-03-17 RX ORDER — HYDROMORPHONE HYDROCHLORIDE 1 MG/ML
0.25 INJECTION, SOLUTION INTRAMUSCULAR; INTRAVENOUS; SUBCUTANEOUS EVERY 5 MIN PRN
Status: DISCONTINUED | OUTPATIENT
Start: 2020-03-17 | End: 2020-03-17 | Stop reason: HOSPADM

## 2020-03-17 RX ORDER — FENTANYL CITRATE 50 UG/ML
INJECTION, SOLUTION INTRAMUSCULAR; INTRAVENOUS PRN
Status: DISCONTINUED | OUTPATIENT
Start: 2020-03-17 | End: 2020-03-17 | Stop reason: SDUPTHER

## 2020-03-17 RX ORDER — SODIUM CHLORIDE, SODIUM LACTATE, POTASSIUM CHLORIDE, CALCIUM CHLORIDE 600; 310; 30; 20 MG/100ML; MG/100ML; MG/100ML; MG/100ML
INJECTION, SOLUTION INTRAVENOUS CONTINUOUS
Status: DISCONTINUED | OUTPATIENT
Start: 2020-03-17 | End: 2020-03-17 | Stop reason: HOSPADM

## 2020-03-17 RX ADMIN — FENTANYL CITRATE 25 MCG: 50 INJECTION, SOLUTION INTRAMUSCULAR; INTRAVENOUS at 09:29

## 2020-03-17 RX ADMIN — MIDAZOLAM 0.5 MG: 1 INJECTION INTRAMUSCULAR; INTRAVENOUS at 09:28

## 2020-03-17 RX ADMIN — SODIUM CHLORIDE, POTASSIUM CHLORIDE, SODIUM LACTATE AND CALCIUM CHLORIDE: 600; 310; 30; 20 INJECTION, SOLUTION INTRAVENOUS at 09:20

## 2020-03-17 ASSESSMENT — PAIN DESCRIPTION - DESCRIPTORS: DESCRIPTORS: ACHING

## 2020-03-17 ASSESSMENT — PAIN SCALES - GENERAL
PAINLEVEL_OUTOF10: 0
PAINLEVEL_OUTOF10: 0

## 2020-03-17 ASSESSMENT — PAIN - FUNCTIONAL ASSESSMENT: PAIN_FUNCTIONAL_ASSESSMENT: 0-10

## 2020-03-17 NOTE — H&P
[address]  [address] [facility], [address] [facility] [place]  [phone]    Procedure History & Physical      Simpson Danger     HPI:    Patient  is here for bilateral  for b/l Suprascapular nerve pulsed RFA under ultrasound guidance. Labs/imaging studies reviewed   All question and concerns addressed including R/B/A associated with the procedure    Past Medical History:   Diagnosis Date    Arthritis     CAD (coronary artery disease)     2012    Hyperlipidemia     Hypertension     Latex allergy     Myocardial infarction [facility]) 2012    Shoulder pain        Past Surgical History:   Procedure Laterality Date    ANESTHESIA NERVE BLOCK Bilateral 6/25/2019    BILATERAL SUPRASCAPULAR BLOCK UNDER ULTRASOUND  (CPT: 65878) performed by Aly London MD at [address] [facility] [address] Bilateral 10/1/2019    BILATERAL [address] [facility] [facility] [address] performed by Aly London MD at [address] [place] [address] Avenue  june 2012   [address]  june 2012    triple bypass    CARPAL TUNNEL RELEASE      CARPAL TUNNEL RELEASE Right 04/25/2018    CORONARY ARTERY BYPASS GRAFT  2012    NERVE BLOCK  11/24/14    bilateral shoulder injection #1    NERVE BLOCK Bilateral 12/1/2014    alexys shoulder injections  #2    NERVE BLOCK N/A 1/7/2015    cervical epidural  #1    NERVE BLOCK N/A 1 21 15    cerv ep #2    NERVE BLOCK Bilateral 01/23/2017    Bilateral shoulder injection #1    NERVE BLOCK Bilateral 06/25/2019    suprascapular block with sedation    NERVE BLOCK Bilateral 10/01/2019    suprascapular     [place] REVISE MEDIAN N/CARPAL TUNNEL SURG Right 4/25/2018    RIGHT HAND CARPAL TUNNEL RELEASE performed by Zelalem Forte MD at [address] [place] [address]       Prior to Admission medications    Medication Sig Start Date End Date Taking?  Authorizing Provider   furosemide (LASIX) 20 MG tablet Take 20 mg by mouth 2 times daily   Yes Historical Provider, MD under ultrasound guidance.     Diomedes Iverson MD

## 2020-03-17 NOTE — OP NOTE
3/17/2020    Patient: Damaris Montalvo  :  1935  Age:  80 y.o. Sex:  female     PRE-PROCEDURE DIAGNOSIS: Bilateral Shoulder pain, OA shoulder, neuralgia/ neuritis. POST-PROCEDURE DIAGNOSIS: Same. PROCEDURE: Bilateral suprascapular nerve radiofrequency ablation (Pulsed) under Ultrasound  guidance. SURGEON:  Siena Thompson MD    ANESTHESIA: MAC- see anesthesia records for details    ESTIMATED BLOOD LOSS: None.  ______________________________________________________________________  BRIEF HISTORY:  Damaris Montalvo comes in today for Bilateral suprascapular nerve radiofrequency ablation- pulsed under Ultrasound guidance. The potential complications of this procedure were discussed with her again today. She has elected to undergo the aforementioned procedure. Rohan complete History & Physical examination were reviewed in depth, a copy of which is in the chart. DESCRIPTION OF PROCEDURE:    After confirming written and informed consent, a time-out was performed and Rohan name and date of birth, the procedure to be performed as well as the plan for the location of the needle insertion were confirmed. The patient was brought into the procedure room and placed in the sitting position. The area of the Bilateral shoulder and scapula was prepped with ChloraPrep and draped in usual sterile manner. Under ultrasound guidance, the suprascapular notch was visualized. The skin and the subcutaneous tissue was anesthetized with 0.5% lidocaine. A 10 mm insulated RFA needle was inserted Bilaterally using in-plane technique until bony contact was made with the scapula at a point just below the suprascapular notch. The needle was then advanced closer to the suprascapular nerve. The nerve was tested for sensory stimulation until satisfactory results were obtained. Then 0.5% lidocaine 2 cc was injected before activation of the RF generator.  The RF electrode was then inserted through the cannula, and Pulsed RFA was done with temperature 42 degrees centigrade for 120 seconds. One RF lesion was made. Once the RF was finished, 4 ml of 0.25% marcaine with 30 mg DepoMedrol was injected on each side. The needle was then removed without any apparent complications. The patient's suprascapular area was cleaned and a bandage was placed over the insertion site. Disposition the patient tolerated the procedure well and there were no complications . Vital signs remained stable throughout the procedure. The patient was escorted to the recovery area where they remained until discharge and written discharge instructions for the procedure were given. Plan: Carolina Jimenez will return to our pain management center as scheduled.      Christine Lozano MD

## 2020-03-30 ENCOUNTER — TELEPHONE (OUTPATIENT)
Dept: PAIN MANAGEMENT | Age: 85
End: 2020-03-30

## 2020-04-03 ENCOUNTER — VIRTUAL VISIT (OUTPATIENT)
Dept: PAIN MANAGEMENT | Age: 85
End: 2020-04-03
Payer: MEDICARE

## 2020-04-03 PROCEDURE — 99442 PR PHYS/QHP TELEPHONE EVALUATION 11-20 MIN: CPT | Performed by: ANESTHESIOLOGY

## 2020-04-03 NOTE — PROGRESS NOTES
[address]  [address] [facility], [address] [place] [address] Francesco  [phone]  Telephone follow up visit      Date of Visit:  4/3/2020    CC:   Chief Complaint   Patient presents with    Follow-up    Shoulder Pain       Consent:    The patient and/or health care decision maker is aware that he/she may receive a bill for this telephone service, depending on his insurance coverage, and has provided verbal consent to proceed: Yes    I have considered the risks of abuse, dependence, addiction and diversion. My patient is aware that they will need a follow-up visit (in-person or virtually) at the appropriate time indicated for continued medications. Further, my patient is aware that when this acute crisis has lifted, they will be expected to return for an in-person visit and all elements of standard local and hospital guidelines in order to continue this medication. Patient location: Home     HPI:  H/o B/l Shoulder pain form OA. S/P Bilateral Suprascapular Pulsed RFA on 3/17/2020. She states that the pain relief is moderate. Pain is better. Appropriate analgesia with current medications regimen: yes . Change in quality of symptoms:no. Medication side effects:none. Recent diagnostic testing:none. Recent interventional procedures:Yes-good pain relief. Pain causes functional limitations/ limits Adl's : Yes    She is also taking pain meds- [place] by her PCP. She has been taking Norco: 7.5 mg 2-3 times a  Day given by her PCP - has been taking this for few years. No signs of misuse abuse or diversion. Pain medications apparently help her pain and improve functionality.     Pain meds helps her with the pain and to be functional.    She has  been on anticoagulation medications to include ASA-81 mg The patient  has not been on herbal supplements. The patient is not diabetic.   Pain is constant and is described as aching, sharp and throbbing.      Previous notes and Pain with USG guidance on 3/17/2020 with good pain relief. Significant improvement In pain and quality of life. Patient was very happy with the outcome. Takes Hydrocodone bid- tid given by her PCP which helps with pain and functionality, no SE's, no signs of misuse abuse or diversion.     It is appropriate to continue the pain meds for pain relief and functional improvement. She can continue it from her current prescriber- her PCP.     Try stretching exercise as tolerated.     TENS unit- use more frequently.     Compound cream for local use- combination of diclofenac 3%, gabapentin 6%, baclofen 2%, lidocaine 5%, doxepin 5%. Use instructions, Dosing and side effects explained.     Counseling regarding the proper medication use, importance of regular exercise program, detailed discussions about the planned procedure done. F/u in 3 months.     We discussed with the patient that combining opioids, benzodiazepines, alcohol, illicit drugs or sleep aids increases the risk of respiratory depression including death. We discussed that these medications may cause drowsiness, sedation or dizziness and have counseled the patient not to drive or operate machinery. We have discussed that these medications will be prescribed only by one provider. We have discussed with the patient about age related risk factors and have thoroughly discussed the importance of taking these medications as prescribed. The patient verbalizes understanding. Patient advised regarding steps to help prevent the spread of COVID-19   SOURCE - https://jacky-griffin.info/. html     1-Stay home except to get medical care  2-Clean your hands often for at least 20 seconds, avoid touching: Avoid touching your eyes, nose, and mouth with unwashed hands. 3-Seek medical attention: Seek prompt medical attention if your illness is worsening (e.g., difficulty breathing).   Call you doctor first.  3-Wear a facemask if you are sick   4-Cover your coughs and sneezes        I affirm this is a Patient Initiated Episode with an Established Patient who has not had a related appointment within my department in the past 7 days or scheduled within the next 24 hours.     Total Time: minutes: 11-20 minutes      Farshad Winston MD    CC:  Blanco Leal MD

## 2020-08-07 ENCOUNTER — OFFICE VISIT (OUTPATIENT)
Dept: PAIN MANAGEMENT | Age: 85
End: 2020-08-07
Payer: MEDICARE

## 2020-08-07 VITALS
TEMPERATURE: 97.3 F | OXYGEN SATURATION: 97 % | WEIGHT: 135 LBS | DIASTOLIC BLOOD PRESSURE: 62 MMHG | RESPIRATION RATE: 16 BRPM | SYSTOLIC BLOOD PRESSURE: 130 MMHG | BODY MASS INDEX: 27.21 KG/M2 | HEIGHT: 59 IN | HEART RATE: 71 BPM

## 2020-08-07 PROCEDURE — 4040F PNEUMOC VAC/ADMIN/RCVD: CPT | Performed by: ANESTHESIOLOGY

## 2020-08-07 PROCEDURE — 99213 OFFICE O/P EST LOW 20 MIN: CPT | Performed by: ANESTHESIOLOGY

## 2020-08-07 PROCEDURE — 1090F PRES/ABSN URINE INCON ASSESS: CPT | Performed by: ANESTHESIOLOGY

## 2020-08-07 PROCEDURE — G8427 DOCREV CUR MEDS BY ELIG CLIN: HCPCS | Performed by: ANESTHESIOLOGY

## 2020-08-07 PROCEDURE — G8400 PT W/DXA NO RESULTS DOC: HCPCS | Performed by: ANESTHESIOLOGY

## 2020-08-07 PROCEDURE — G8417 CALC BMI ABV UP PARAM F/U: HCPCS | Performed by: ANESTHESIOLOGY

## 2020-08-07 PROCEDURE — 1123F ACP DISCUSS/DSCN MKR DOCD: CPT | Performed by: ANESTHESIOLOGY

## 2020-08-07 PROCEDURE — 1036F TOBACCO NON-USER: CPT | Performed by: ANESTHESIOLOGY

## 2020-08-07 NOTE — PROGRESS NOTES
[address]  [address] [facility], [address] [place] [address] Francesco  [phone]    Follow up Note      Gilberto Nguyễn     Date of Visit:  8/7/2020    CC:  Patient presents for follow up   Chief Complaint   Patient presents with    Shoulder Pain       HPI:  H/o B/l Shoulder pain form OA.     S/P Bilateral Suprascapular Pulsed RFA on 3/17/2020.     She states that the pain relief is moderate for short period of time. Pain is unchanged. Change in quality of symptoms:no. Medication side effects:none. Recent diagnostic testing:none. Recent interventional procedures:none. Pain causes functional limitations/ limits Adl's : Yes     She is also taking pain meds- [place] by her PCP. She has been taking Norco. No signs of misuse abuse or diversion. Pain medications apparently help her pain and improve functionality. She has  been on anticoagulation medications to include ASA-81 mg The patient  has not been on herbal supplements.  The patient is not diabetic. Pain is constant and is described as aching, sharp and throbbing.      Nursing notes and details of the pain history reviewed. Please see intake notes for details. Imaging studies:  9/2016:  EXAM: XR SHOULDER RIGHT STANDARD, XR HUMERUS RIGHT STANDARD, XR RADIUS   ULNA RIGHT STANDARD       COMPARISON: None       HISTORY:  pain and decreased range of motion after fall       TECHNIQUE: 2 views right shoulder, 2 views right humerus and 2 views   right radius/ulna       FINDINGS: Severe glenohumeral arthritis identified. Shoulder alignment   is normal. The humerus is normal in contour.  The radius and ulna are   also within normal limits without evidence of fracture.               Impression       Right shoulder:   1.  No acute findings.       Right humerus:   1.  No acute findings.       Right radius and ulna:   1.  No acute findings.         CT cervical spine:  2016:      There is normal alignment of the cervical spine.  The MD at [address] [place] [address]       Prior to Admission medications    Medication Sig Start Date End Date Taking? Authorizing Provider   furosemide (LASIX) 20 MG tablet Take 20 mg by mouth 2 times daily   Yes Historical Provider, MD   atorvastatin (LIPITOR) 20 MG tablet TAKE ONE TABLET BY MOUTH EVERY DAY FOR CHOLESTEROL 9/1/19  Yes Historical Provider, MD   HYDROcodone-acetaminophen ([place]) 7.5-325 MG per tablet hydrocodone 7.5 mg-acetaminophen 325 mg tablet   Yes Historical Provider, MD   diphenhydrAMINE (BENADRYL) 25 MG capsule Take 25 mg by mouth every 6 hours as needed for Itching   Yes Historical Provider, MD   aspirin 81 MG tablet Take 81 mg by mouth daily    Yes Historical Provider, MD   metoprolol (LOPRESSOR) 25 MG tablet Take 12.5 mg by mouth 2 times daily. Yes Historical Provider, MD   LISINOPRIL PO Take 10 mg by mouth 2 times daily. Yes Historical Provider, MD       Allergies   Allergen Reactions    Latex Rash     contact    Adhesive Tape     Glimepiride      Itching--9/2013    Tetanus Toxoids Swelling       Social History     Socioeconomic History    Marital status:       Spouse name: Not on file    Number of children: Not on file    Years of education: Not on file    Highest education level: Not on file   Occupational History    Not on file   Social Needs    Financial resource strain: Not on file    Food insecurity     Worry: Not on file     Inability: Not on file    Transportation needs     Medical: Not on file     Non-medical: Not on file   Tobacco Use    Smoking status: Never Smoker    Smokeless tobacco: Never Used   Substance and Sexual Activity    Alcohol use: Not Currently     Drinks per session: 1 or 2     Binge frequency: Weekly    Drug use: No    Sexual activity: Never   Lifestyle    Physical activity     Days per week: Not on file     Minutes per session: Not on file    Stress: Not on file   Relationships    Social connections     Talks on phone: Not on file     Gets together: Not on file     Attends Jainism service: Not on file     Active member of club or organization: Not on file     Attends meetings of clubs or organizations: Not on file     Relationship status: Not on file    Intimate partner violence     Fear of current or ex partner: Not on file     Emotionally abused: Not on file     Physically abused: Not on file     Forced sexual activity: Not on file   Other Topics Concern    Not on file   Social History Narrative    Not on file       Family History   Problem Relation Age of Onset    Cancer Mother     Diabetes Mother     Hypertension Mother     Hypertension Father     Heart Failure Father     Diabetes Sister     Diabetes Sister     Hypertension Sister     Arthritis Other        REVIEW OF SYSTEMS:     Caron Suero denies fever/chills, chest pain, shortness of breath, new bowel or bladder complaints. All other review of systems was negative. PHYSICAL EXAMINATION:      /62   Pulse 71   Temp 97.3 °F (36.3 °C) (Infrared)   Resp 16   Ht 4' 11\" (1.499 m)   Wt 135 lb (61.2 kg)   SpO2 97%   BMI 27.27 kg/m²     General:       General appearance:  Pleasant and well-hydrated, in no distress and A & O x 3  Build:Overweight  Function: Rises from a seated position with difficulty     HEENT:     Head:normocephalic, atraumatic  Pupils:regular, round, equal  Sclera: icterus absent     Lungs:     Breathing:normal breathing pattern      CVS:     RRR     Abdomen:     Shape:non-distended and normal  Tenderness:none  Guarding:none     Cervical spine:     Reduced range of motion      Lumbar spine:     Range of motion is reduced      Musculoskeletal:     Trigger points -no      Extremities:     Tremors:None bilaterally upper and lower     Shoulders:  Bilateral shoulder : ROM reduced, significant tenderness over the shoulder bilateral, significant pain on ROM, marked limited to ROM .  Atrophy of the shoulder muscles noted.     Knee:     ROM : decreased  Joint line tenderness : minimal     Neurological:     Sensory: Normal to light touch      Motor:   Weakness of the bilateral shoulder muscles (limited by pain) otherwise UE muscle strength  Is equal     B/l LE motor power 4-5/5       Assessment/Plan:     Diagnosis Orders   1. Pain of both shoulder joints     2. Cervical facet syndrome     3. Neuralgia and neuritis     4. Osteoarthritis of both shoulders, unspecified osteoarthritis type     5. Chronic, continuous use of opioids       H/o Bilateral shoulder pain due to OA of shoulder. Failed conservative treatment   On chronic pain meds.     S/p b/l suprascapular nerve block with USG guidance with excellent pain relief on the first time-but subsequent interventions of the suprascapular block did not provide significant long-term pain relief. Apparently has been evaluated by a shoulder surgeon and contemplating shoulder surgery. If she decides not to have surgery can call for b/l  shoulder joint injection in office. Continue HEP. Counseling done regarding importance of regular exercise program and appropriate medication use. She is getting the pain medication from her primary care physician and will continue to do so.     Kamla Tsai MD

## 2020-08-07 NOTE — PROGRESS NOTES
Do you currently have any of the following:    Fever: No  Headache:  No  Cough: No  Shortness of breath: No  Exposed to anyone with these symptoms: No                                                                                                                Flory Abbott presents to the [facility] on 8/7/2020. Seretha Homans is complaining of pain bilateral shoulders. . The pain is constant. The pain is described as aching and throbbing. Pain is rated on her best day at a 8, on her worst day at a 10, and on average at a 9 on the VAS scale. She took her last dose of Norco this AM.. Seretha Homans does have issues with constipation. Any procedures since your last visit: No    She is not on NSAIDS and  is  on anticoagulation medications to include ASA and is managed by Pat Caballero MD  .     Pacemaker or defibrilator: No Physician managing device is NA.       /62   Pulse 71   Temp 97.3 °F (36.3 °C) (Infrared)   Resp 16   Ht 4' 11\" (1.499 m)   Wt 135 lb (61.2 kg)   SpO2 97%   BMI 27.27 kg/m²      No LMP recorded.  Patient is postmenopausal.

## 2020-11-06 ENCOUNTER — OFFICE VISIT (OUTPATIENT)
Dept: PAIN MANAGEMENT | Age: 85
End: 2020-11-06
Payer: MEDICARE

## 2020-11-06 VITALS
HEIGHT: 59 IN | BODY MASS INDEX: 27.42 KG/M2 | SYSTOLIC BLOOD PRESSURE: 132 MMHG | RESPIRATION RATE: 18 BRPM | OXYGEN SATURATION: 91 % | HEART RATE: 56 BPM | DIASTOLIC BLOOD PRESSURE: 58 MMHG | WEIGHT: 136 LBS | TEMPERATURE: 97.6 F

## 2020-11-06 PROCEDURE — 20610 DRAIN/INJ JOINT/BURSA W/O US: CPT | Performed by: ANESTHESIOLOGY

## 2020-11-06 PROCEDURE — 6360000002 HC RX W HCPCS

## 2020-11-06 PROCEDURE — 99213 OFFICE O/P EST LOW 20 MIN: CPT | Performed by: ANESTHESIOLOGY

## 2020-11-06 RX ORDER — BUPIVACAINE HYDROCHLORIDE 2.5 MG/ML
6 INJECTION, SOLUTION INFILTRATION; PERINEURAL ONCE
Status: COMPLETED | OUTPATIENT
Start: 2020-11-06 | End: 2020-11-06

## 2020-11-06 RX ORDER — METHYLPREDNISOLONE ACETATE 40 MG/ML
30 INJECTION, SUSPENSION INTRA-ARTICULAR; INTRALESIONAL; INTRAMUSCULAR; SOFT TISSUE ONCE
Status: COMPLETED | OUTPATIENT
Start: 2020-11-06 | End: 2020-11-06

## 2020-11-06 RX ADMIN — METHYLPREDNISOLONE ACETATE 40 MG: 40 INJECTION, SUSPENSION INTRA-ARTICULAR; INTRALESIONAL; INTRAMUSCULAR; SOFT TISSUE at 16:35

## 2020-11-06 RX ADMIN — BUPIVACAINE HYDROCHLORIDE 25 MG: 2.5 INJECTION, SOLUTION INFILTRATION; PERINEURAL at 16:33

## 2020-11-06 RX ADMIN — METHYLPREDNISOLONE ACETATE 40 MG: 40 INJECTION, SUSPENSION INTRA-ARTICULAR; INTRALESIONAL; INTRAMUSCULAR; SOFT TISSUE at 16:34

## 2020-11-06 NOTE — PROGRESS NOTES
Do you currently have any of the following:    Fever: No  Headache:  No  Cough: No  Shortness of breath: No  Exposed to anyone with these symptoms: No                                                                                                                Brendia Bound presents to the [facility] on 11/6/2020. Lasha Napoles is complaining of pain shoulder pain. The pain is constant. The pain is described as aching and sharp. Pain is rated on her best day at a 4, on her worst day at a 10, and on average at a 6 on the VAS scale. She took her last dose of Norco today. Lasha Napoles does have issues with constipation. She has under control. Any procedures since your last visit: No, with  % relief. She is not on NSAIDS and  is  on anticoagulation medications to include ASA and is managed by Nalini Zhang MD  .     Pacemaker or defibrilator: No Physician managing device is .       BP (!) 132/58   Pulse 56   Temp 97.6 °F (36.4 °C) (Infrared)   Resp 18   Ht 4' 11\" (1.499 m)   Wt 136 lb (61.7 kg)   SpO2 91%   BMI 27.47 kg/m²      No LMP recorded.  Patient is postmenopausal.

## 2022-06-22 ENCOUNTER — APPOINTMENT (OUTPATIENT)
Dept: CT IMAGING | Age: 87
DRG: 346 | End: 2022-06-22
Payer: MEDICARE

## 2022-06-22 ENCOUNTER — HOSPITAL ENCOUNTER (INPATIENT)
Age: 87
LOS: 1 days | Discharge: HOME | DRG: 346 | End: 2022-06-23
Attending: EMERGENCY MEDICINE | Admitting: SURGERY
Payer: MEDICARE

## 2022-06-22 ENCOUNTER — ANESTHESIA (OUTPATIENT)
Dept: ENDOSCOPY | Age: 87
DRG: 346 | End: 2022-06-22
Payer: MEDICARE

## 2022-06-22 ENCOUNTER — ANESTHESIA EVENT (OUTPATIENT)
Dept: ENDOSCOPY | Age: 87
DRG: 346 | End: 2022-06-22
Payer: MEDICARE

## 2022-06-22 DIAGNOSIS — K56.2 CECAL VOLVULUS (HCC): Primary | ICD-10-CM

## 2022-06-22 LAB
ALBUMIN SERPL-MCNC: 4.1 G/DL (ref 3.5–5.2)
ALP BLD-CCNC: 85 U/L (ref 35–104)
ALT SERPL-CCNC: 11 U/L (ref 0–32)
ANION GAP SERPL CALCULATED.3IONS-SCNC: 10 MMOL/L (ref 7–16)
AST SERPL-CCNC: 24 U/L (ref 0–31)
BASOPHILS ABSOLUTE: 0.02 E9/L (ref 0–0.2)
BASOPHILS RELATIVE PERCENT: 0.2 % (ref 0–2)
BILIRUB SERPL-MCNC: 1 MG/DL (ref 0–1.2)
BILIRUBIN URINE: NEGATIVE
BLOOD, URINE: NEGATIVE
BUN BLDV-MCNC: 12 MG/DL (ref 6–23)
CALCIUM SERPL-MCNC: 9.1 MG/DL (ref 8.6–10.2)
CHLORIDE BLD-SCNC: 100 MMOL/L (ref 98–107)
CLARITY: CLEAR
CO2: 25 MMOL/L (ref 22–29)
COLOR: YELLOW
CREAT SERPL-MCNC: 0.6 MG/DL (ref 0.5–1)
EOSINOPHILS ABSOLUTE: 0.48 E9/L (ref 0.05–0.5)
EOSINOPHILS RELATIVE PERCENT: 4.4 % (ref 0–6)
GFR AFRICAN AMERICAN: >60
GFR NON-AFRICAN AMERICAN: >60 ML/MIN/1.73
GLUCOSE BLD-MCNC: 167 MG/DL (ref 74–99)
GLUCOSE URINE: NEGATIVE MG/DL
HCT VFR BLD CALC: 36.3 % (ref 34–48)
HEMOGLOBIN: 12.1 G/DL (ref 11.5–15.5)
IMMATURE GRANULOCYTES #: 0.03 E9/L
IMMATURE GRANULOCYTES %: 0.3 % (ref 0–5)
KETONES, URINE: ABNORMAL MG/DL
LACTIC ACID: 0.5 MMOL/L (ref 0.5–2.2)
LEUKOCYTE ESTERASE, URINE: NEGATIVE
LIPASE: 19 U/L (ref 13–60)
LYMPHOCYTES ABSOLUTE: 1.15 E9/L (ref 1.5–4)
LYMPHOCYTES RELATIVE PERCENT: 10.4 % (ref 20–42)
MCH RBC QN AUTO: 32.2 PG (ref 26–35)
MCHC RBC AUTO-ENTMCNC: 33.3 % (ref 32–34.5)
MCV RBC AUTO: 96.5 FL (ref 80–99.9)
MONOCYTES ABSOLUTE: 0.59 E9/L (ref 0.1–0.95)
MONOCYTES RELATIVE PERCENT: 5.3 % (ref 2–12)
NEUTROPHILS ABSOLUTE: 8.76 E9/L (ref 1.8–7.3)
NEUTROPHILS RELATIVE PERCENT: 79.4 % (ref 43–80)
NITRITE, URINE: NEGATIVE
PDW BLD-RTO: 12.4 FL (ref 11.5–15)
PH UA: 5 (ref 5–9)
PLATELET # BLD: 169 E9/L (ref 130–450)
PMV BLD AUTO: 10.1 FL (ref 7–12)
POTASSIUM SERPL-SCNC: 4 MMOL/L (ref 3.5–5)
PROTEIN UA: NEGATIVE MG/DL
RBC # BLD: 3.76 E12/L (ref 3.5–5.5)
SODIUM BLD-SCNC: 135 MMOL/L (ref 132–146)
SPECIFIC GRAVITY UA: 1.02 (ref 1–1.03)
TOTAL PROTEIN: 6.6 G/DL (ref 6.4–8.3)
UROBILINOGEN, URINE: 0.2 E.U./DL
WBC # BLD: 11 E9/L (ref 4.5–11.5)

## 2022-06-22 PROCEDURE — 83690 ASSAY OF LIPASE: CPT

## 2022-06-22 PROCEDURE — 3700000001 HC ADD 15 MINUTES (ANESTHESIA): Performed by: INTERNAL MEDICINE

## 2022-06-22 PROCEDURE — 0D9L8ZZ DRAINAGE OF TRANSVERSE COLON, VIA NATURAL OR ARTIFICIAL OPENING ENDOSCOPIC: ICD-10-PCS | Performed by: INTERNAL MEDICINE

## 2022-06-22 PROCEDURE — 74177 CT ABD & PELVIS W/CONTRAST: CPT

## 2022-06-22 PROCEDURE — 2709999900 HC NON-CHARGEABLE SUPPLY: Performed by: INTERNAL MEDICINE

## 2022-06-22 PROCEDURE — 36415 COLL VENOUS BLD VENIPUNCTURE: CPT

## 2022-06-22 PROCEDURE — 99285 EMERGENCY DEPT VISIT HI MDM: CPT

## 2022-06-22 PROCEDURE — 99223 1ST HOSP IP/OBS HIGH 75: CPT | Performed by: SURGERY

## 2022-06-22 PROCEDURE — 7100000000 HC PACU RECOVERY - FIRST 15 MIN: Performed by: INTERNAL MEDICINE

## 2022-06-22 PROCEDURE — 85025 COMPLETE CBC W/AUTO DIFF WBC: CPT

## 2022-06-22 PROCEDURE — 6360000002 HC RX W HCPCS: Performed by: NURSE ANESTHETIST, CERTIFIED REGISTERED

## 2022-06-22 PROCEDURE — 6360000002 HC RX W HCPCS: Performed by: HOSPITALIST

## 2022-06-22 PROCEDURE — 6360000004 HC RX CONTRAST MEDICATION: Performed by: RADIOLOGY

## 2022-06-22 PROCEDURE — 80053 COMPREHEN METABOLIC PANEL: CPT

## 2022-06-22 PROCEDURE — 6360000002 HC RX W HCPCS: Performed by: EMERGENCY MEDICINE

## 2022-06-22 PROCEDURE — 2580000003 HC RX 258: Performed by: NURSE ANESTHETIST, CERTIFIED REGISTERED

## 2022-06-22 PROCEDURE — 83605 ASSAY OF LACTIC ACID: CPT

## 2022-06-22 PROCEDURE — 2580000003 HC RX 258: Performed by: EMERGENCY MEDICINE

## 2022-06-22 PROCEDURE — 2500000003 HC RX 250 WO HCPCS: Performed by: INTERNAL MEDICINE

## 2022-06-22 PROCEDURE — 81003 URINALYSIS AUTO W/O SCOPE: CPT

## 2022-06-22 PROCEDURE — 1200000000 HC SEMI PRIVATE

## 2022-06-22 PROCEDURE — 96374 THER/PROPH/DIAG INJ IV PUSH: CPT

## 2022-06-22 PROCEDURE — 6360000002 HC RX W HCPCS: Performed by: SURGERY

## 2022-06-22 PROCEDURE — 3609155600 HC COLONOSCOPY WITH DECOMPRESSION: Performed by: INTERNAL MEDICINE

## 2022-06-22 PROCEDURE — 3700000000 HC ANESTHESIA ATTENDED CARE: Performed by: INTERNAL MEDICINE

## 2022-06-22 PROCEDURE — 94150 VITAL CAPACITY TEST: CPT

## 2022-06-22 PROCEDURE — 7100000001 HC PACU RECOVERY - ADDTL 15 MIN: Performed by: INTERNAL MEDICINE

## 2022-06-22 RX ORDER — MORPHINE SULFATE 4 MG/ML
4 INJECTION, SOLUTION INTRAMUSCULAR; INTRAVENOUS
Status: DISCONTINUED | OUTPATIENT
Start: 2022-06-22 | End: 2022-06-22 | Stop reason: SDUPTHER

## 2022-06-22 RX ORDER — MORPHINE SULFATE 2 MG/ML
2 INJECTION, SOLUTION INTRAMUSCULAR; INTRAVENOUS
Status: DISCONTINUED | OUTPATIENT
Start: 2022-06-22 | End: 2022-06-22 | Stop reason: SDUPTHER

## 2022-06-22 RX ORDER — ENOXAPARIN SODIUM 100 MG/ML
40 INJECTION SUBCUTANEOUS DAILY
Status: DISCONTINUED | OUTPATIENT
Start: 2022-06-22 | End: 2022-06-23 | Stop reason: HOSPADM

## 2022-06-22 RX ORDER — SODIUM CHLORIDE 9 MG/ML
INJECTION, SOLUTION INTRAVENOUS CONTINUOUS
Status: DISCONTINUED | OUTPATIENT
Start: 2022-06-22 | End: 2022-06-23 | Stop reason: HOSPADM

## 2022-06-22 RX ORDER — METOPROLOL TARTRATE 5 MG/5ML
5 INJECTION INTRAVENOUS EVERY 8 HOURS
Status: DISCONTINUED | OUTPATIENT
Start: 2022-06-22 | End: 2022-06-23

## 2022-06-22 RX ORDER — SODIUM CHLORIDE, SODIUM LACTATE, POTASSIUM CHLORIDE, CALCIUM CHLORIDE 600; 310; 30; 20 MG/100ML; MG/100ML; MG/100ML; MG/100ML
INJECTION, SOLUTION INTRAVENOUS CONTINUOUS
Status: DISCONTINUED | OUTPATIENT
Start: 2022-06-22 | End: 2022-06-23 | Stop reason: HOSPADM

## 2022-06-22 RX ORDER — FENTANYL CITRATE 0.05 MG/ML
50 INJECTION, SOLUTION INTRAMUSCULAR; INTRAVENOUS ONCE
Status: COMPLETED | OUTPATIENT
Start: 2022-06-22 | End: 2022-06-22

## 2022-06-22 RX ORDER — ONDANSETRON 4 MG/1
4 TABLET, ORALLY DISINTEGRATING ORAL EVERY 8 HOURS PRN
Status: DISCONTINUED | OUTPATIENT
Start: 2022-06-22 | End: 2022-06-23 | Stop reason: HOSPADM

## 2022-06-22 RX ORDER — SODIUM CHLORIDE 9 MG/ML
INJECTION, SOLUTION INTRAVENOUS CONTINUOUS PRN
Status: DISCONTINUED | OUTPATIENT
Start: 2022-06-22 | End: 2022-06-22 | Stop reason: SDUPTHER

## 2022-06-22 RX ORDER — MORPHINE SULFATE 2 MG/ML
1 INJECTION, SOLUTION INTRAMUSCULAR; INTRAVENOUS
Status: DISCONTINUED | OUTPATIENT
Start: 2022-06-22 | End: 2022-06-23 | Stop reason: HOSPADM

## 2022-06-22 RX ORDER — SODIUM CHLORIDE 0.9 % (FLUSH) 0.9 %
5-40 SYRINGE (ML) INJECTION EVERY 12 HOURS SCHEDULED
Status: DISCONTINUED | OUTPATIENT
Start: 2022-06-22 | End: 2022-06-23 | Stop reason: HOSPADM

## 2022-06-22 RX ORDER — SODIUM CHLORIDE 9 MG/ML
INJECTION, SOLUTION INTRAVENOUS PRN
Status: DISCONTINUED | OUTPATIENT
Start: 2022-06-22 | End: 2022-06-23 | Stop reason: HOSPADM

## 2022-06-22 RX ORDER — MORPHINE SULFATE 2 MG/ML
2 INJECTION, SOLUTION INTRAMUSCULAR; INTRAVENOUS
Status: DISCONTINUED | OUTPATIENT
Start: 2022-06-22 | End: 2022-06-23 | Stop reason: HOSPADM

## 2022-06-22 RX ORDER — ONDANSETRON 2 MG/ML
4 INJECTION INTRAMUSCULAR; INTRAVENOUS EVERY 6 HOURS PRN
Status: DISCONTINUED | OUTPATIENT
Start: 2022-06-22 | End: 2022-06-23 | Stop reason: HOSPADM

## 2022-06-22 RX ORDER — PROPOFOL 10 MG/ML
INJECTION, EMULSION INTRAVENOUS PRN
Status: DISCONTINUED | OUTPATIENT
Start: 2022-06-22 | End: 2022-06-22 | Stop reason: SDUPTHER

## 2022-06-22 RX ORDER — SODIUM CHLORIDE 0.9 % (FLUSH) 0.9 %
5-40 SYRINGE (ML) INJECTION PRN
Status: DISCONTINUED | OUTPATIENT
Start: 2022-06-22 | End: 2022-06-23 | Stop reason: HOSPADM

## 2022-06-22 RX ADMIN — SODIUM CHLORIDE: 9 INJECTION, SOLUTION INTRAVENOUS at 14:54

## 2022-06-22 RX ADMIN — METOPROLOL TARTRATE 5 MG: 5 INJECTION INTRAVENOUS at 11:49

## 2022-06-22 RX ADMIN — FENTANYL CITRATE 50 MCG: 50 INJECTION INTRAMUSCULAR; INTRAVENOUS at 05:49

## 2022-06-22 RX ADMIN — MORPHINE SULFATE 2 MG: 2 INJECTION, SOLUTION INTRAMUSCULAR; INTRAVENOUS at 19:56

## 2022-06-22 RX ADMIN — MORPHINE SULFATE 2 MG: 2 INJECTION, SOLUTION INTRAMUSCULAR; INTRAVENOUS at 12:25

## 2022-06-22 RX ADMIN — MORPHINE SULFATE 2 MG: 2 INJECTION, SOLUTION INTRAMUSCULAR; INTRAVENOUS at 22:23

## 2022-06-22 RX ADMIN — MORPHINE SULFATE 2 MG: 2 INJECTION, SOLUTION INTRAMUSCULAR; INTRAVENOUS at 09:21

## 2022-06-22 RX ADMIN — SODIUM CHLORIDE: 9 INJECTION, SOLUTION INTRAVENOUS at 09:21

## 2022-06-22 RX ADMIN — METHYLNALTREXONE BROMIDE 8 MG: 12 INJECTION, SOLUTION SUBCUTANEOUS at 11:48

## 2022-06-22 RX ADMIN — MORPHINE SULFATE 2 MG: 2 INJECTION, SOLUTION INTRAMUSCULAR; INTRAVENOUS at 15:05

## 2022-06-22 RX ADMIN — METOPROLOL TARTRATE 5 MG: 5 INJECTION INTRAVENOUS at 19:47

## 2022-06-22 RX ADMIN — PROPOFOL 240 MG: 10 INJECTION, EMULSION INTRAVENOUS at 16:03

## 2022-06-22 RX ADMIN — IOPAMIDOL 50 ML: 755 INJECTION, SOLUTION INTRAVENOUS at 07:18

## 2022-06-22 ASSESSMENT — PAIN SCALES - GENERAL
PAINLEVEL_OUTOF10: 8
PAINLEVEL_OUTOF10: 7
PAINLEVEL_OUTOF10: 5
PAINLEVEL_OUTOF10: 8
PAINLEVEL_OUTOF10: 10

## 2022-06-22 ASSESSMENT — ENCOUNTER SYMPTOMS
SINUS PRESSURE: 0
DIARRHEA: 0
VOMITING: 0
ABDOMINAL PAIN: 1
EYE PAIN: 0
EYE DISCHARGE: 0
NAUSEA: 0
BACK PAIN: 0
ABDOMINAL DISTENTION: 0
SORE THROAT: 0
WHEEZING: 0
SHORTNESS OF BREATH: 0
EYE REDNESS: 0
COUGH: 0

## 2022-06-22 ASSESSMENT — LIFESTYLE VARIABLES
HOW MANY STANDARD DRINKS CONTAINING ALCOHOL DO YOU HAVE ON A TYPICAL DAY: 1 OR 2
SMOKING_STATUS: 0
HOW OFTEN DO YOU HAVE A DRINK CONTAINING ALCOHOL: 4 OR MORE TIMES A WEEK

## 2022-06-22 ASSESSMENT — PAIN DESCRIPTION - LOCATION
LOCATION: ABDOMEN

## 2022-06-22 ASSESSMENT — PAIN - FUNCTIONAL ASSESSMENT: PAIN_FUNCTIONAL_ASSESSMENT: NONE - DENIES PAIN

## 2022-06-22 NOTE — H&P
General Surgery History and Physical    Patient's Name/Date of Birth: Marzena Mcdonald / 1935    Date: June 22, 2022     Surgeon: Elise Mckeon M.D.    PCP: Chica Daly MD     Chief Complaint: abd pain     HPI:   Marzena Mcdonald is a 80 y.o. female who presents for evaluation of abd pain that was severe last night and came to ED and CT showed cecal volvulus. She is on chronic narcotics and has chronic constipation and takes miralax daily.  Timing of pain is constant, radiation to llq, alleviated by nothing and started months ago but was most severe last night and severity is 2-9/10      Past Medical History:   Diagnosis Date    Arthritis     CAD (coronary artery disease)     2012    Hyperlipidemia     Hypertension     Latex allergy     Myocardial infarction [facility]) 2012    Shoulder pain        Past Surgical History:   Procedure Laterality Date    ANESTHESIA NERVE BLOCK Bilateral 6/25/2019    BILATERAL SUPRASCAPULAR BLOCK UNDER ULTRASOUND  (CPT: 17722) performed by Doss Schilder, MD at [facility] Bilateral 10/1/2019    BILATERAL [facility] performed by Doss Schilder, MD at [address] [place] [address] Avenue  june 2012   [address]  june 2012    triple bypass    CARPAL TUNNEL RELEASE      CARPAL TUNNEL RELEASE Right 04/25/2018    CORONARY ARTERY BYPASS GRAFT  2012    NERVE BLOCK  11/24/14    bilateral shoulder injection #1    NERVE BLOCK Bilateral 12/1/2014    alexys shoulder injections  #2    NERVE BLOCK N/A 1/7/2015    cervical epidural  #1    NERVE BLOCK N/A 1 21 15    cerv ep #2    NERVE BLOCK Bilateral 01/23/2017    Bilateral shoulder injection #1    NERVE BLOCK Bilateral 06/25/2019    suprascapular block with sedation    NERVE BLOCK Bilateral 10/01/2019    suprascapular     OTHER SURGICAL HISTORY Bilateral 03/17/2020    bilateral suprascapular nerve pulsed RFA under ultrasound guidance    PAIN MANAGEMENT PROCEDURE Bilateral 3/17/2020    BILATERAL SUPRASCAPULAR NERVE RADIOFREQUENCY ABLATION WITH ULTRASOUND (CPT 18181 [id number]) SEDATION performed by Eugenia Ruiz MD at [facility] N/CARPAL TUNNEL SURG Right 4/25/2018    RIGHT HAND CARPAL TUNNEL RELEASE performed by Sun Jones MD at [address] [place] [address]       Current Facility-Administered Medications   Medication Dose Route Frequency Provider Last Rate Last Admin    0.9 % sodium chloride infusion   IntraVENous Continuous Kaylynn Vieira DO        morphine (PF) injection 2 mg  2 mg IntraVENous Q2H PRN Adalgisa Rangel MD        Or   Paula Mcdowell morphine injection 4 mg  4 mg IntraVENous Q2H PRN Adalgisa Rangel MD         Current Outpatient Medications   Medication Sig Dispense Refill    furosemide (LASIX) 20 MG tablet Take 20 mg by mouth 2 times daily      atorvastatin (LIPITOR) 20 MG tablet TAKE ONE TABLET BY MOUTH EVERY DAY FOR CHOLESTEROL  3    HYDROcodone-acetaminophen (NORCO) 7.5-325 MG per tablet hydrocodone 7.5 mg-acetaminophen 325 mg tablet      diphenhydrAMINE (BENADRYL) 25 MG capsule Take 25 mg by mouth every 6 hours as needed for Itching      aspirin 81 MG tablet Take 81 mg by mouth daily       metoprolol (LOPRESSOR) 25 MG tablet Take 12.5 mg by mouth 2 times daily.  LISINOPRIL PO Take 10 mg by mouth 2 times daily. Allergies   Allergen Reactions    Latex Rash     contact    Adhesive Tape     Glimepiride      Itching--9/2013    Tetanus Toxoids Swelling       The patient has a family history that is negative for severe cardiovascular or respiratory issues, negative for reaction to anesthesia. Social History     Socioeconomic History    Marital status:       Spouse name: Not on file    Number of children: Not on file    Years of education: Not on file    Highest education level: Not on file   Occupational History    Not on file   Tobacco Use    Smoking status: Never Smoker    Smokeless tobacco: Never Used   Vaping Use    Vaping Use: Never used   Substance and Sexual Activity    Alcohol use: Not Currently    Drug use: No    Sexual activity: Never   Other Topics Concern    Not on file   Social History Narrative    Not on file     Social Determinants of Health     Financial Resource Strain:     Difficulty of Paying Living Expenses: Not on file   Food Insecurity:     Worried About Running Out of Food in the Last Year: Not on file    Bharathi of Food in the Last Year: Not on file   Transportation Needs:     Lack of Transportation (Medical): Not on file    Lack of Transportation (Non-Medical):  Not on file   Physical Activity:     Days of Exercise per Week: Not on file    Minutes of Exercise per Session: Not on file   Stress:     Feeling of Stress : Not on file   Social Connections:     Frequency of Communication with Friends and Family: Not on file    Frequency of Social Gatherings with Friends and Family: Not on file    Attends Muslim Services: Not on file    Active Member of [address] [place] [address] or Organizations: Not on file    Attends Club or Organization Meetings: Not on file    Marital Status: Not on file   Intimate Partner Violence:     Fear of Current or Ex-Partner: Not on file    Emotionally Abused: Not on file    Physically Abused: Not on file    Sexually Abused: Not on file   Housing Stability:     Unable to Pay for Housing in the Last Year: Not on file    Number of [place] in the Last Year: Not on file    Unstable Housing in the Last Year: Not on file           Review of Systems  Review of Systems -  General ROS: negative for - chills, fatigue or malaise  ENT ROS: negative for - hearing change, nasal congestion or nasal discharge  Allergy and Immunology ROS: negative for - hives, itchy/watery eyes or nasal congestion  Hematological and Lymphatic ROS: negative for - blood clots, blood transfusions, bruising or fatigue  Endocrine ROS: negative for - malaise/lethargy, mood swings, palpitations or polydipsia/polyuria  Respiratory ROS: negative for - sputum changes, stridor, tachypnea or wheezing  Cardiovascular ROS: negative for - irregular heartbeat, loss of consciousness, murmur or orthopnea  Gastrointestinal ROS: negative for - constipation, diarrhea, gas/bloating, heartburn or hematemesis  Genito-Urinary ROS: negative for -  genital discharge, genital ulcers or hematuria  Musculoskeletal ROS: negative for - gait disturbance, muscle pain or muscular weakness    Physical exam:   BP (!) 151/104   Pulse 95   Temp 98.7 °F (37.1 °C)   Resp 20   Ht 4' 11\" (1.499 m)   Wt 134 lb (60.8 kg)   SpO2 94%   BMI 27.06 kg/m²   General appearance:  NAD  Head: NCAT, PERRLA, EOMI, red conjunctiva  Neck: supple, no masses  Lungs: CTAB, equal chest rise bilateral  Heart: Reg rate  Abdomen: soft, nontender, nondistended  Skin; no lesions  Gu: no cva tenderness  Extremities: extremities normal, atraumatic, no cyanosis or edema      Radiology:  CT abdomen/pelvis:  Cecal volvulus    Assessment:  80 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 If that does not work or shows signs of bowel compromise will plan for right hemicolectomy  Discussed the risk, benefits and alternatives of surgery including wound infections, bleeding, scar and hernia formation and the risks of general anesthetic including MI, CVA, sudden death or reactions to anesthetic medications. The patient understands the risks and alternatives and the possibility of converting to an open procedure. All questions were answered to the patient's satisfaction and they freely signed the consent.       Jacki Marie MD  9:10 AM  6/22/2022

## 2022-06-22 NOTE — ED PROVIDER NOTES
Patient is an 79 y/o female who presents to the ED with abdominal pain. Patient states she had onset of left lower abdominal pain yesterday morning. The pain comes and goes and is worse when she is lying down. She states that it improves with sitting up. She denies any fever, nausea, vomiting, diarrhea, blood in the stool, dysuria or gross hematuria. She reports normal bowel movements. Review of Systems   Constitutional: Negative for chills and fever. HENT: Negative for ear pain, sinus pressure and sore throat. Eyes: Negative for pain, discharge and redness. Respiratory: Negative for cough, shortness of breath and wheezing. Cardiovascular: Negative for chest pain. Gastrointestinal: Positive for abdominal pain. Negative for abdominal distention, diarrhea, nausea and vomiting. Genitourinary: Negative for dysuria and frequency. Musculoskeletal: Negative for arthralgias and back pain. Skin: Negative for rash and wound. Neurological: Negative for weakness and headaches. Hematological: Negative for adenopathy. All other systems reviewed and are negative. Physical Exam  Vitals and nursing note reviewed. Constitutional:       General: She is not in acute distress. HENT:      Head: Normocephalic and atraumatic. Mouth/Throat:      Mouth: Mucous membranes are moist.   Eyes:      Pupils: Pupils are equal, round, and reactive to light. Cardiovascular:      Rate and Rhythm: Normal rate and regular rhythm. Heart sounds: No murmur heard. Pulmonary:      Effort: Pulmonary effort is normal. No respiratory distress. Breath sounds: Normal breath sounds. No stridor. No wheezing, rhonchi or rales. Abdominal:      General: Abdomen is flat. Bowel sounds are normal.      Palpations: Abdomen is soft. Tenderness: There is abdominal tenderness in the periumbilical area and left lower quadrant. There is no guarding.  Negative signs include Pitts's sign and McBurney's sign.   Skin:     General: Skin is warm and dry. Findings: No rash. Neurological:      Mental Status: She is alert and oriented to person, place, and time. Procedures     [facility]     ED Course as of 06/22/22 0838 Wed Jun 22, 2022 0753 7:53 AM EDT  I received this patient at sign out from Dr. Carole Bains. I have discussed the patient's initial exam, treatment and plan of care with the out going physician. I have introduced my self to the patient / family and have answered their questions to this point. I have examined the patient myself and reviewed ordered tests / medications and  reviewed any available results to this point. If a resident is involved in the Emergency Department care, I have discussed my findings and plan with them as well. Results discussed with patient. Will call surgeon. Abdominal pain on palpation of the LUQ. Patient frequently belching. [JS]      ED Course User Index  [JS] Helga Segovia DO     7:00 AM EDT  I have signed this patient out to the oncoming physician, Dr. Marcelene Libman. I have discussed the patient's initial exam, treatment and plan of care with the on coming physician. I have notified the patient / family of the change in treating physician and answered their questions to this point. ED Course as of 06/22/22 0838 Wed Jun 22, 2022 0753 7:53 AM EDT  I received this patient at sign out from Dr. Carole Bains. I have discussed the patient's initial exam, treatment and plan of care with the out going physician. I have introduced my self to the patient / family and have answered their questions to this point. I have examined the patient myself and reviewed ordered tests / medications and  reviewed any available results to this point. If a resident is involved in the Emergency Department care, I have discussed my findings and plan with them as well. Results discussed with patient. Will call surgeon. Abdominal pain on palpation of the LUQ.   Patient frequently elizabeth. [JS]      ED Course User Index  [JS] Desmond Hall,        --------------------------------------------- PAST HISTORY ---------------------------------------------  Past Medical History:  has a past medical history of Arthritis, CAD (coronary artery disease), Hyperlipidemia, Hypertension, Latex allergy, Myocardial infarction ([facility] [address] and Shoulder pain. Past Surgical History:  has a past surgical history that includes Aortic valve replacement (june 2012); Nerve Block (11/24/14); Nerve Block (Bilateral, 12/1/2014); Nerve Block (N/A, 1/7/2015); Nerve Block (N/A, 1 21 15); Coronary artery bypass graft (2012); Nerve Block (Bilateral, 01/23/2017); Cardiac surgery (june 2012); Carpal tunnel release; Carpal tunnel release (Right, 04/25/2018); pr revise median n/carpal tunnel surg (Right, 4/25/2018); Nerve Block (Bilateral, 06/25/2019); Anesthesia Nerve Block (Bilateral, 6/25/2019); Nerve Block (Bilateral, 10/01/2019); Anesthesia Nerve Block (Bilateral, 10/1/2019); other surgical history (Bilateral, 03/17/2020); and Pain management procedure (Bilateral, 3/17/2020). Social History:  reports that she has never smoked. She has never used smokeless tobacco. She reports previous alcohol use. She reports that she does not use drugs. Family History: family history includes Arthritis in an other family member; Cancer in her mother; Diabetes in her mother, sister, and sister; Heart Failure in her father; Hypertension in her father, mother, and sister. The patients home medications have been reviewed.     Allergies: Latex, Adhesive tape, Glimepiride, and Tetanus toxoids    -------------------------------------------------- RESULTS -------------------------------------------------    Lab  Results for orders placed or performed during the hospital encounter of 06/22/22   Urinalysis   Result Value Ref Range    Color, UA Yellow Straw/Yellow    Clarity, UA Clear Clear    Glucose, Ur Negative Negative mg/dL hemodynamically stable and been closely monitored during their ED course. Please note that the withdrawal or failure to initiate urgent interventions for this patient would likely result in a life threatening deterioration or permanent disability. Accordingly this patient received 0 minutes of critical care time, excluding separately billable procedures. Clinical Impression  1. Cecal volvulus ([address]          Disposition  Patient's disposition: Admit to med/surg floor  Patient's condition is stable.        Adriana Helms DO  06/22/22 7191

## 2022-06-22 NOTE — CONSULTS
Duong Blackwell M.D. [facility]  Dr. Jenni Hopson M.D.,  Dr. Kayleigh Barboza M.D.,  Dr. Lee Puga D.O.,  Dr. Kirk Bennett D.O. ,  Dr. Danika Gilbert M.D.,  Dr. Finn Benz, [facility]  80 y.o.  female      Re: Decompressive colonoscopy  Requesting physician: Dr. Lois Degroot  Date:11:19 AM 6/22/2022          HPI: 55-year-old female patient presenting to the hospital because of abdominal pain. Patient is accompanied by her daughter. Patient reports severe abdominal pain last night which is now somewhat improved. Patient apparently has history of chronic narcotics for chronic pain. She denies any significant nausea vomiting currently. She denies any pain but has pain on palpation. CT scan in the emergency department has showed cecal volvulus. Patient reports previous endoscopy by Dr. Ekaterina Delcid.       Information sources:   -Patient  -family  -medical record  -health care team    PMHx:  Past Medical History:   Diagnosis Date    Arthritis     CAD (coronary artery disease)     2012    Hyperlipidemia     Hypertension     Latex allergy     [facility] infarction [facility]) 2012    Shoulder pain        PSHx:  Past Surgical History:   Procedure Laterality Date    ANESTHESIA NERVE BLOCK Bilateral 6/25/2019    BILATERAL SUPRASCAPULAR BLOCK UNDER ULTRASOUND  (CPT: 01073) performed by Dima Patton MD at [facility] Bilateral 10/1/2019    BILATERAL [facility] UNDER ULTRASOUND GUIDANCE performed by Dima Patton MD at [address] [place] [address]  june 2012   Rebbecca Hinders CARDIAC SURGERY  june 2012    triple bypass    CARPAL TUNNEL RELEASE      CARPAL TUNNEL RELEASE Right 04/25/2018    CORONARY ARTERY BYPASS GRAFT  2012    EYE SURGERY Bilateral     cataracts    NERVE BLOCK  11/24/14    bilateral shoulder injection #1    NERVE BLOCK Bilateral 12/1/2014    alexys shoulder injections  #2    NERVE BLOCK N/A 1/7/2015 cervical epidural  #1    NERVE BLOCK N/A 1 21 15    cerv ep #2    NERVE BLOCK Bilateral 01/23/2017    Bilateral shoulder injection #1    NERVE BLOCK Bilateral 06/25/2019    suprascapular block with sedation    NERVE BLOCK Bilateral 10/01/2019    suprascapular     OTHER SURGICAL HISTORY Bilateral 03/17/2020    bilateral suprascapular nerve pulsed RFA under ultrasound guidance    PAIN MANAGEMENT PROCEDURE Bilateral 3/17/2020    BILATERAL SUPRASCAPULAR NERVE RADIOFREQUENCY ABLATION WITH ULTRASOUND (CPT 89144 75029 [id number]) SEDATION performed by Libia Bowden MD at [facility] [address]/CARPAL TUNNEL SURG Right 4/25/2018    RIGHT HAND CARPAL TUNNEL RELEASE performed by Monisha Kramer MD at [facility]:  Current Facility-Administered Medications   Medication Dose Route Frequency Provider Last Rate Last Admin    0.9 % sodium chloride infusion   IntraVENous Continuous Robyn Dickson  mL/hr at 06/22/22 0921 New Bag at 06/22/22 0921    sodium chloride flush 0.9 % injection 5-40 mL  5-40 mL IntraVENous 2 times per day Robyn Dickson MD        sodium chloride flush 0.9 % injection 5-40 mL  5-40 mL IntraVENous PRN Robyn Dickson MD        0.9 % sodium chloride infusion   IntraVENous PRN Robyn Dickson MD        ondansetron (ZOFRAN-ODT) disintegrating tablet 4 mg  4 mg Oral Q8H PRN Robyn Dickson MD        Or    ondansetron [facility]) injection 4 mg  4 mg IntraVENous Q6H PRN Robyn Dickson MD        enoxaparin (LOVENOX) injection 40 mg  40 mg SubCUTAneous Daily Robyn Dickson MD        lactated ringers infusion   IntraVENous Continuous Robyn Dickson MD        morphine (PF) injection 1 mg  1 mg IntraVENous Q2H PRN Roybn Dickson MD        Or    morphine (PF) injection 2 mg  2 mg IntraVENous Q2H PRN Robyn Dickson MD        methylnaltrexone (RELISTOR) injection 8 mg  8 mg SubCUTAneous Once Claudine Archer MD           SocHx:  Social History Socioeconomic History    Marital status:      Spouse name: Not on file    Number of children: Not on file    Years of education: Not on file    Highest education level: Not on file   Occupational History    Not on file   Tobacco Use    Smoking status: Never Smoker    Smokeless tobacco: Never Used   Vaping Use    Vaping Use: Never used   Substance and Sexual Activity    Alcohol use: Not Currently    Drug use: No    Sexual activity: Never   Other Topics Concern    Not on file   Social History Narrative    Not on file     Social Determinants of Health     Financial Resource Strain:     Difficulty of Paying Living Expenses: Not on file   Food Insecurity:     Worried About Running Out of Food in the Last Year: Not on file    Bharathi of Food in the Last Year: Not on file   Transportation Needs:     Lack of Transportation (Medical): Not on file    Lack of Transportation (Non-Medical):  Not on file   Physical Activity:     Days of Exercise per Week: Not on file    Minutes of Exercise per Session: Not on file   Stress:     Feeling of Stress : Not on file   Social Connections:     Frequency of Communication with Friends and Family: Not on file    Frequency of Social Gatherings with Friends and Family: Not on file    Attends [employer] Services: Not on file    Active Member of [address] [place] [address] [employer] or Organizations: Not on file    Attends Club or Organization Meetings: Not on file    Marital Status: Not on file   Intimate Partner Violence:     Fear of Current or Ex-Partner: Not on file    Emotionally Abused: Not on file    Physically Abused: Not on file    Sexually Abused: Not on file   Housing Stability:     Unable to Pay for Housing in the Last Year: Not on file    Number of [place] in the Last Year: Not on file    Unstable Housing in the Last Year: Not on file       FamHx:  Family History   Problem Relation Age of Onset    Cancer Mother     Diabetes Mother     Hypertension Mother    Doc Solders Hypertension Father     Heart Failure Father     Diabetes Sister     Diabetes Sister     Hypertension Sister     Arthritis Other        Allergy:  Allergies   Allergen Reactions    Latex Rash     contact    Adhesive Tape     Glimepiride      Itching--9/2013    Tetanus Toxoids Swelling         ROS: As described in the HPI and in addition is negative upon detailed review of systems or unobtainable unless otherwise stated in this dictation. PE:  BP (!) 152/61   Pulse 87   Temp 98.7 °F (37.1 °C) (Oral)   Resp 18   Ht 4' 11\" (1.499 m)   Wt 134 lb (60.8 kg)   SpO2 96%   BMI 27.06 kg/m²     Gen.: NAD/elderly  female  Head: Atraumatic/normocephalic  Eyes: EOMI/anicteric sclerae  ENT: Moist oral mucosa/no discharge from nose ears  Neck: Supple/trachea midline  Chest: CTA B  Cor: Regular/S1-S2  Abd.: Soft. Bowel sounds present. Tender in the left lower quadrant.   No guarding/no rebound tenderness  Extr.:  No significant peripheral edema  Muscles: Decreased tone and bulk, consistent with age and condition  Skin: Warm and dry      DATA:     Lab Results   Component Value Date    WBC 11.0 06/22/2022    RBC 3.76 06/22/2022    HGB 12.1 06/22/2022    HCT 36.3 06/22/2022    MCV 96.5 06/22/2022    MCH 32.2 06/22/2022    MCHC 33.3 06/22/2022    RDW 12.4 06/22/2022     06/22/2022    MPV 10.1 06/22/2022     Lab Results   Component Value Date     06/22/2022    K 4.0 06/22/2022     06/22/2022    CO2 25 06/22/2022    BUN 12 06/22/2022    CREATININE 0.6 06/22/2022    CALCIUM 9.1 06/22/2022    PROT 6.6 06/22/2022    LABALBU 4.1 06/22/2022    BILITOT 1.0 06/22/2022    ALKPHOS 85 06/22/2022    AST 24 06/22/2022    ALT 11 06/22/2022     Lab Results   Component Value Date    LIPASE 19 06/22/2022     No results found for: AMYLASE      ASSESSMENT/PLAN:  Patient Active Problem List   Diagnosis    Osteoarthritis of both shoulders    DDD (degenerative disc disease), cervical    Cervical facet syndrome  Somatic dysfunction bilateral shoulders    Subarachnoid hemorrhage (HCC)    Type 2 diabetes mellitus (HCC)    Periorbital ecchymosis of right eye    Chronic pain of both shoulders    DDD (degenerative disc disease), lumbar    Lumbosacral spondylosis without myelopathy    Spinal stenosis of lumbar region    Pain of both shoulder joints    Cervicalgia    Chronic, continuous use of opioids    Neuralgia and neuritis    Cecal volvulus (HCC)     1. Abdominal pain/cecal volvulus  -Attempt decompressive colonoscopy later today  -In case of failure to decompress or recurrence he patient may require surgical intervention    Above has been discussed with the patient and all questions answered to their satisfaction. They are agreeable with the plan and wished to proceed with endoscopy as described. Please, see orders. Thank you for the opportunity to see this patient in consultation. Tino Washington MD  6/22/2022  11:19 AM    NOTE:  This report was transcribed using voice recognition software. Every effort was made to ensure accuracy; however, inadvertent computerized transcription errors may be present.

## 2022-06-22 NOTE — ANESTHESIA PRE PROCEDURE
Department of Anesthesiology  Preprocedure Note       Name:  Edy Bach   Age:  80 y.o.  :  1935                                          MRN:  [id number]         Date:  2022      Surgeon: Jocelyn Yates):  Kourtney Mena MD    Procedure: COLONOSCOPY DIAGNOSTIC (N/A )    Medications prior to admission:   Prior to Admission medications    Medication Sig Start Date End Date Taking? Authorizing Provider   furosemide (LASIX) 20 MG tablet Take 20 mg by mouth 2 times daily    Historical Provider, MD   atorvastatin (LIPITOR) 20 MG tablet TAKE ONE TABLET BY MOUTH EVERY DAY FOR CHOLESTEROL 19   Historical Provider, MD   HYDROcodone-acetaminophen (Jared Ruddle) 7.5-325 MG per tablet hydrocodone 7.5 mg-acetaminophen 325 mg tablet    Historical Provider, MD   diphenhydrAMINE (BENADRYL) 25 MG capsule Take 25 mg by mouth every 6 hours as needed for Itching    Historical Provider, MD   aspirin 81 MG tablet Take 81 mg by mouth daily     Historical Provider, MD   metoprolol (LOPRESSOR) 25 MG tablet Take 12.5 mg by mouth 2 times daily. Historical Provider, MD   LISINOPRIL PO Take 10 mg by mouth 2 times daily. Historical Provider, MD       Current medications:    No current facility-administered medications for this visit. No current outpatient medications on file.      Facility-Administered Medications Ordered in Other Visits   Medication Dose Route Frequency Provider Last Rate Last Admin    0.9 % sodium chloride infusion   IntraVENous Continuous Judson Corral  mL/hr at 22 0921 New Bag at 22 0921    sodium chloride flush 0.9 % injection 5-40 mL  5-40 mL IntraVENous 2 times per day Judson Corral MD        sodium chloride flush 0.9 % injection 5-40 mL  5-40 mL IntraVENous PRN Judson Corral MD        0.9 % sodium chloride infusion   IntraVENous PRN Judson Corral MD        ondansetron (ZOFRAN-ODT) disintegrating tablet 4 mg  4 mg Oral Q8H PRN Judson Corral MD        Or   Enoc Tinsley ondansetron (ZOFRAN) injection 4 mg  4 mg IntraVENous Q6H PRN Malena Beltrán MD        enoxaparin (LOVENOX) injection 40 mg  40 mg SubCUTAneous Daily Malena Beltrán MD        lactated ringers infusion   IntraVENous Continuous Malena Beltrán MD        morphine (PF) injection 1 mg  1 mg IntraVENous Q2H PRN Malena Beltrán MD        Or    morphine (PF) injection 2 mg  2 mg IntraVENous Q2H PRN Malena Beltrán MD   2 mg at 06/22/22 1225    metoprolol (LOPRESSOR) injection 5 mg  5 mg IntraVENous Q8H Joanne Arias MD   5 mg at 06/22/22 1149       Allergies: Allergies   Allergen Reactions    Latex Rash     contact    Adhesive Tape     Glimepiride      Itching--9/2013    Tetanus Toxoids Swelling       Problem List:    Patient Active Problem List   Diagnosis Code    Osteoarthritis of both shoulders M19.011, M19.012    DDD (degenerative disc disease), cervical M50.30    Cervical facet syndrome M47.812    Somatic dysfunction bilateral shoulders M99.09    Subarachnoid hemorrhage (HCC) I60.9    Type 2 diabetes mellitus (HCC) E11.9    Periorbital ecchymosis of right eye S00. 11XA    Chronic pain of both shoulders M25.511, G89.29, M25.512    DDD (degenerative disc disease), lumbar M51.36    Lumbosacral spondylosis without myelopathy M47.817    Spinal stenosis of lumbar region M48.061    Pain of both shoulder joints M25.511, M25.512    Cervicalgia M54.2    Chronic, continuous use of opioids F11.90    Neuralgia and neuritis M79.2    Cecal volvulus (HCC) K56.2       Past Medical History:        Diagnosis Date    Arthritis     CAD (coronary artery disease)     2012    Hyperlipidemia     Hypertension     Latex allergy     Myocardial infarction [facility]) 2012    Shoulder pain        Past Surgical History:        Procedure Laterality Date    ANESTHESIA NERVE BLOCK Bilateral 6/25/2019    BILATERAL SUPRASCAPULAR BLOCK UNDER ULTRASOUND  (CPT: 23544) performed by Jamilah Raglnad MD at [facility] lb (61.7 kg)   08/07/20 135 lb (61.2 kg)     There is no height or weight on file to calculate BMI.    CBC:   Lab Results   Component Value Date    WBC 11.0 06/22/2022    RBC 3.76 06/22/2022    HGB 12.1 06/22/2022    HCT 36.3 06/22/2022    MCV 96.5 06/22/2022    RDW 12.4 06/22/2022     06/22/2022       CMP:   Lab Results   Component Value Date     06/22/2022    K 4.0 06/22/2022     06/22/2022    CO2 25 06/22/2022    BUN 12 06/22/2022    CREATININE 0.6 06/22/2022    GFRAA >60 06/22/2022    LABGLOM >60 06/22/2022    GLUCOSE 167 06/22/2022    PROT 6.6 06/22/2022    CALCIUM 9.1 06/22/2022    BILITOT 1.0 06/22/2022    ALKPHOS 85 06/22/2022    AST 24 06/22/2022    ALT 11 06/22/2022       POC Tests: No results for input(s): POCGLU, POCNA, POCK, POCCL, POCBUN, POCHEMO, POCHCT in the last 72 hours.     Coags:   Lab Results   Component Value Date    PROTIME 10.8 09/01/2016    INR 1.0 09/01/2016    APTT 26.2 09/01/2016       HCG (If Applicable): No results found for: PREGTESTUR, PREGSERUM, HCG, HCGQUANT     ABGs: No results found for: PHART, PO2ART, ELP7EQR, VCZ8GHE, BEART, E3LRPFDT     Type & Screen (If Applicable):  No results found for: LABABO, [address]    Anesthesia Evaluation  Patient summary reviewed no history of anesthetic complications:   Airway: Mallampati: IV  TM distance: >3 FB   Neck ROM: full  Comment: Double chin  Mouth opening: > = 3 FB   Dental:          Pulmonary: breath sounds clear to auscultation      (-) not a current smoker                           Cardiovascular:  Exercise tolerance: poor (<4 METS),   (+) hypertension:, valvular problems/murmurs (AVR):, past MI: > 6 months, CAD:, CABG/stent:, hyperlipidemia        Rhythm: irregular  Rate: normal           Beta Blocker:  Dose within 24 Hrs         Neuro/Psych:                ROS comment: H/O Subarachnoid hemorrhage  GI/Hepatic/Renal:             Endo/Other:    (+) DiabetesType II DM, , : arthritis:., .                 Abdominal: (-) obese       Vascular: negative vascular ROS. Other Findings:             Anesthesia Plan      MAC     ASA 3       Induction: intravenous. Anesthetic plan and risks discussed with patient. Plan discussed with CRNA.                     Dante Hart MD   6/22/2022      MELQUIADES Ramsey - CRNA

## 2022-06-22 NOTE — ED NOTES
Medicated as ordered. Urine sample/blood obtained and sent to lab. Pt. Admits to pain relief. Daughter at bedside.      Chavo Thompson RN  06/22/22 3284

## 2022-06-22 NOTE — PLAN OF CARE
Problem: Discharge Planning  Goal: Discharge to home or other facility with appropriate resources  Outcome: Progressing  Flowsheets (Taken 6/22/2022 1050)  Discharge to home or other facility with appropriate resources: Identify barriers to discharge with patient and caregiver     Problem: Safety - Adult  Goal: Free from fall injury  Outcome: Progressing     Problem: ABCDS Injury Assessment  Goal: Absence of physical injury  Outcome: Progressing     Problem: Skin/Tissue Integrity  Goal: Absence of new skin breakdown  Description: 1. Monitor for areas of redness and/or skin breakdown  2. Assess vascular access sites hourly  3. Every 4-6 hours minimum:  Change oxygen saturation probe site  4. Every 4-6 hours:  If on nasal continuous positive airway pressure, respiratory therapy assess nares and determine need for appliance change or resting period.   Outcome: Progressing     Problem: Gastrointestinal - Adult  Goal: Minimal or absence of nausea and vomiting  Outcome: Progressing  Goal: Maintains or returns to baseline bowel function  Outcome: Progressing

## 2022-06-22 NOTE — H&P
H&p reviewed. No changes. Blood pressure (!) 152/61, pulse 87, temperature 98.7 °F (37.1 °C), temperature source Oral, resp. rate 16, height 4' 11\" (1.499 m), weight 134 lb (60.8 kg), SpO2 96 %.

## 2022-06-22 NOTE — ANESTHESIA POSTPROCEDURE EVALUATION
Department of Anesthesiology  Postprocedure Note    Patient: Bernice Cam  MRN: [id number]  YOB: 1935  Date of evaluation: 6/22/2022      Procedure Summary     Date: 06/22/22 Room / Location: [address] [place] [address] 01 / [address] [facility], [place]    Anesthesia Start: 0161 Anesthesia Stop: 8087    Procedure: COLONOSCOPY FLEXIBLE W/ DECOMPRESSION (N/A ) Diagnosis:       Other impaction of intestine ([address]      (Other impaction of intestine ([address] [K56.49])    Surgeons: Lance Jones MD Responsible Provider: Sherlie Lombard, MD    Anesthesia Type: MAC ASA Status: 3          Anesthesia Type: No value filed. Ward Phase I: Ward Score: 9    Ward Phase II:      Last vitals:   Vitals Value Taken Time   /56 06/22/22 1643   Temp 98.3 °F (36.8 °C) 06/22/22 1610   Pulse 67 06/22/22 1644   Resp 18 06/22/22 1644   SpO2 95 % 06/22/22 1644   Vitals shown include unvalidated device data.       Anesthesia Post Evaluation    Patient location during evaluation: bedside  Patient participation: complete - patient participated  Level of consciousness: awake  Pain score: 0  Airway patency: patent  Nausea & Vomiting: no nausea and no vomiting  Complications: no  Cardiovascular status: hemodynamically stable  Respiratory status: acceptable  Hydration status: euvolemic

## 2022-06-22 NOTE — PROGRESS NOTES
Spoke at length with patient's sons and daughter who expressed concerns about the doctor suggesting she only take Tylenol. I explained the possibility and rationale of Palliative Care for symptom management and as a segue to Hospice. They expressed appreciation for same. Also discussed senior assistance in the home. Report called to Christopher Shultz RN, above shared with her with request for SW consult and Palliative Care Consult.

## 2022-06-23 ENCOUNTER — APPOINTMENT (OUTPATIENT)
Dept: GENERAL RADIOLOGY | Age: 87
DRG: 346 | End: 2022-06-23
Payer: MEDICARE

## 2022-06-23 VITALS
WEIGHT: 134 LBS | RESPIRATION RATE: 18 BRPM | SYSTOLIC BLOOD PRESSURE: 107 MMHG | HEART RATE: 69 BPM | TEMPERATURE: 98.2 F | HEIGHT: 59 IN | OXYGEN SATURATION: 94 % | DIASTOLIC BLOOD PRESSURE: 73 MMHG | BODY MASS INDEX: 27.01 KG/M2

## 2022-06-23 PROBLEM — D64.9 ANEMIA: Status: ACTIVE | Noted: 2021-04-21

## 2022-06-23 LAB
ALBUMIN SERPL-MCNC: 3.8 G/DL (ref 3.5–5.2)
ALP BLD-CCNC: 77 U/L (ref 35–104)
ALT SERPL-CCNC: 8 U/L (ref 0–32)
ANION GAP SERPL CALCULATED.3IONS-SCNC: 7 MMOL/L (ref 7–16)
AST SERPL-CCNC: 20 U/L (ref 0–31)
BASOPHILS ABSOLUTE: 0.03 E9/L (ref 0–0.2)
BASOPHILS RELATIVE PERCENT: 0.3 % (ref 0–2)
BILIRUB SERPL-MCNC: 1.3 MG/DL (ref 0–1.2)
BUN BLDV-MCNC: 7 MG/DL (ref 6–23)
CALCIUM SERPL-MCNC: 8.7 MG/DL (ref 8.6–10.2)
CHLORIDE BLD-SCNC: 103 MMOL/L (ref 98–107)
CO2: 23 MMOL/L (ref 22–29)
CREAT SERPL-MCNC: 0.5 MG/DL (ref 0.5–1)
EOSINOPHILS ABSOLUTE: 1.07 E9/L (ref 0.05–0.5)
EOSINOPHILS RELATIVE PERCENT: 11.9 % (ref 0–6)
GFR AFRICAN AMERICAN: >60
GFR NON-AFRICAN AMERICAN: >60 ML/MIN/1.73
GLUCOSE BLD-MCNC: 106 MG/DL (ref 74–99)
HCT VFR BLD CALC: 34 % (ref 34–48)
HEMOGLOBIN: 11.3 G/DL (ref 11.5–15.5)
IMMATURE GRANULOCYTES #: 0.02 E9/L
IMMATURE GRANULOCYTES %: 0.2 % (ref 0–5)
LACTIC ACID: 1.1 MMOL/L (ref 0.5–2.2)
LYMPHOCYTES ABSOLUTE: 1.69 E9/L (ref 1.5–4)
LYMPHOCYTES RELATIVE PERCENT: 18.8 % (ref 20–42)
MCH RBC QN AUTO: 32.7 PG (ref 26–35)
MCHC RBC AUTO-ENTMCNC: 33.2 % (ref 32–34.5)
MCV RBC AUTO: 98.3 FL (ref 80–99.9)
MONOCYTES ABSOLUTE: 0.72 E9/L (ref 0.1–0.95)
MONOCYTES RELATIVE PERCENT: 8 % (ref 2–12)
NEUTROPHILS ABSOLUTE: 5.46 E9/L (ref 1.8–7.3)
NEUTROPHILS RELATIVE PERCENT: 60.8 % (ref 43–80)
PDW BLD-RTO: 12.4 FL (ref 11.5–15)
PLATELET # BLD: 155 E9/L (ref 130–450)
PMV BLD AUTO: 9.7 FL (ref 7–12)
POTASSIUM REFLEX MAGNESIUM: 3.8 MMOL/L (ref 3.5–5)
RBC # BLD: 3.46 E12/L (ref 3.5–5.5)
SODIUM BLD-SCNC: 133 MMOL/L (ref 132–146)
TOTAL PROTEIN: 6.4 G/DL (ref 6.4–8.3)
WBC # BLD: 9 E9/L (ref 4.5–11.5)

## 2022-06-23 PROCEDURE — 2500000003 HC RX 250 WO HCPCS: Performed by: INTERNAL MEDICINE

## 2022-06-23 PROCEDURE — 85025 COMPLETE CBC W/AUTO DIFF WBC: CPT

## 2022-06-23 PROCEDURE — 6370000000 HC RX 637 (ALT 250 FOR IP): Performed by: HOSPITALIST

## 2022-06-23 PROCEDURE — 36415 COLL VENOUS BLD VENIPUNCTURE: CPT

## 2022-06-23 PROCEDURE — 99239 HOSP IP/OBS DSCHRG MGMT >30: CPT | Performed by: SURGERY

## 2022-06-23 PROCEDURE — 6360000002 HC RX W HCPCS: Performed by: SURGERY

## 2022-06-23 PROCEDURE — 80053 COMPREHEN METABOLIC PANEL: CPT

## 2022-06-23 PROCEDURE — 83605 ASSAY OF LACTIC ACID: CPT

## 2022-06-23 PROCEDURE — 74018 RADEX ABDOMEN 1 VIEW: CPT

## 2022-06-23 RX ORDER — ASPIRIN 81 MG/1
81 TABLET ORAL DAILY
Status: DISCONTINUED | OUTPATIENT
Start: 2022-06-23 | End: 2022-06-23 | Stop reason: HOSPADM

## 2022-06-23 RX ORDER — DIPHENHYDRAMINE HCL 25 MG
25 TABLET ORAL EVERY 6 HOURS PRN
Status: DISCONTINUED | OUTPATIENT
Start: 2022-06-23 | End: 2022-06-23 | Stop reason: HOSPADM

## 2022-06-23 RX ORDER — ASPIRIN 81 MG/1
81 TABLET ORAL DAILY
Qty: 30 TABLET | Refills: 3 | Status: SHIPPED | OUTPATIENT
Start: 2022-06-23 | End: 2022-06-23 | Stop reason: HOSPADM

## 2022-06-23 RX ORDER — ATORVASTATIN CALCIUM 10 MG/1
10 TABLET, FILM COATED ORAL NIGHTLY
Status: DISCONTINUED | OUTPATIENT
Start: 2022-06-23 | End: 2022-06-23 | Stop reason: HOSPADM

## 2022-06-23 RX ORDER — LISINOPRIL 10 MG/1
10 TABLET ORAL 2 TIMES DAILY
Status: DISCONTINUED | OUTPATIENT
Start: 2022-06-23 | End: 2022-06-23 | Stop reason: HOSPADM

## 2022-06-23 RX ADMIN — MORPHINE SULFATE 2 MG: 2 INJECTION, SOLUTION INTRAMUSCULAR; INTRAVENOUS at 01:22

## 2022-06-23 RX ADMIN — NALOXEGOL OXALATE 25 MG: 12.5 TABLET, FILM COATED ORAL at 05:06

## 2022-06-23 RX ADMIN — METOPROLOL TARTRATE 5 MG: 5 INJECTION INTRAVENOUS at 05:05

## 2022-06-23 RX ADMIN — MORPHINE SULFATE 2 MG: 2 INJECTION, SOLUTION INTRAMUSCULAR; INTRAVENOUS at 10:00

## 2022-06-23 RX ADMIN — ENOXAPARIN SODIUM 40 MG: 100 INJECTION SUBCUTANEOUS at 09:54

## 2022-06-23 ASSESSMENT — PAIN SCALES - GENERAL
PAINLEVEL_OUTOF10: 7
PAINLEVEL_OUTOF10: 7
PAINLEVEL_OUTOF10: 3

## 2022-06-23 ASSESSMENT — PAIN DESCRIPTION - ORIENTATION
ORIENTATION: RIGHT;LEFT
ORIENTATION: RIGHT;LEFT

## 2022-06-23 ASSESSMENT — PAIN DESCRIPTION - LOCATION
LOCATION: SHOULDER
LOCATION: SHOULDER

## 2022-06-23 ASSESSMENT — PAIN DESCRIPTION - DESCRIPTORS
DESCRIPTORS: ACHING
DESCRIPTORS: ACHING

## 2022-06-23 ASSESSMENT — PAIN - FUNCTIONAL ASSESSMENT
PAIN_FUNCTIONAL_ASSESSMENT: ACTIVITIES ARE NOT PREVENTED
PAIN_FUNCTIONAL_ASSESSMENT: ACTIVITIES ARE NOT PREVENTED

## 2022-06-23 NOTE — DISCHARGE SUMMARY
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 Discharge Exam: /73   Pulse 69   Temp 98.2 °F (36.8 °C)   Resp 18   Ht 4' 11\" (1.499 m)   Wt 134 lb (60.8 kg)   SpO2 94%   BMI 27.06 kg/m²     General appearance: alert, appears stated age and cooperative  Head: Normocephalic, without obvious abnormality, atraumatic  Neck: no adenopathy, no carotid bruit, no JVD, supple, symmetrical, trachea midline and thyroid not enlarged, symmetric, no tenderness/mass/nodules  Lungs: clear to auscultation bilaterally  Heart: regular rate and rhythm, S1, S2 normal, no murmur, click, rub or gallop  Abdomen: soft, non-tender; bowel sounds normal; no masses,  no organomegaly and incisions clean and dry  Extremities: extremities normal, atraumatic, no cyanosis or edema      Disposition: home    Patient Instructions: Activity: activity as tolerated  Diet: regular diet  Wound Care: keep wound clean and dry    Follow-up with Dr. Sandee Loya in 2 weeks. Over 30 min spent preparing discharge and discussing it and follow up instructions with patient.   Signed:  Benson Longo MD  6/23/2022  5:32 PM

## 2022-06-23 NOTE — PROGRESS NOTES
Subjective:    Patient is seen for medical management she is admitted to surgery service for volvulus. Other medical problems include hypertension CAD DM2 and hyperlipidemia. Patient is seen with daughter at bedside. Patient complaining of abdominal pain. She is getting prep for colonoscopy. Objective:    /73   Pulse 69   Temp 98.2 °F (36.8 °C)   Resp 18   Ht 4' 11\" (1.499 m)   Wt 134 lb (60.8 kg)   SpO2 94%   BMI 27.06 kg/m²   Head and Neck: normal atraumatic, no neck masses, normal thyroid, no jvd  Heart:  regular rate and rhythm, S1, S2 normal, no murmur, click, rub or gallop  Lungs:  chest clear, no wheezing, rales, normal symmetric air entry,  no chest wall deformities or tenderness  Abd: Distended tender generalized organomegaly  Extrem:  No clubbing, cyanosis, or edema  Neuro:Normal, no focal neurological deficit     Results for Sharon Caldwell (MRN [id number]) as of 6/23/2022 07:04   Ref.  Range 6/22/2022 05:30   Sodium Latest Ref Range: 132 - 146 mmol/L 135   Potassium Latest Ref Range: 3.5 - 5.0 mmol/L 4.0   Chloride Latest Ref Range: 98 - 107 mmol/L 100   CO2 Latest Ref Range: 22 - 29 mmol/L 25   BUN,BUNPL Latest Ref Range: 6 - 23 mg/dL 12   Creatinine Latest Ref Range: 0.5 - 1.0 mg/dL 0.6   Anion Gap Latest Ref Range: 7 - 16 mmol/L 10   GFR Non- Latest Ref Range: >=60 mL/min/1.73 >60   GFR African American Unknown >60   Lactic Acid Latest Ref Range: 0.5 - 2.2 mmol/L 0.5   GLUCOSE, FASTING,GF Latest Ref Range: 74 - 99 mg/dL 167 (H)   CALCIUM, SERUM, [id number] Latest Ref Range: 8.6 - 10.2 mg/dL 9.1   Total Protein Latest Ref Range: 6.4 - 8.3 g/dL 6.6   Albumin Latest Ref Range: 3.5 - 5.2 g/dL 4.1   Alk Phos Latest Ref Range: 35 - 104 U/L 85   ALT Latest Ref Range: 0 - 32 U/L 11   AST Latest Ref Range: 0 - 31 U/L 24   Bilirubin Latest Ref Range: 0.0 - 1.2 mg/dL 1.0   Lipase Latest Ref Range: 13 - 60 U/L 19       Assessment:    Principal Problem:    Cecal volvulus ([facility]  HTN  CAD  DM2  HYPERLIPEDEMIA    Plan:  Patient will be off oral medications we will give her beta-blocker IV monitor blood pressure monitor blood sugar and insulin hold off other medications until become  p.o.   We will monitor blood and vitals  Benjamin Regalado MD  7:04 AM  6/23/2022

## 2022-06-23 NOTE — PROGRESS NOTES
PROGRESS NOTE  By Sandip Noriega M.D. [facility]  Dr. Maria A Be M.D.,  Dr. Lexis Olsen M.D.,   Dr. Raissa Han D.O.,  Dr. Fritz Hernandez M.D.,  Dr. Julio Hong D.O.,  [facility] , [place] [facility]  80 y.o.  female    SUBJECTIVE:  Denies abdominal pain. Denies nausea vomiting.   Tolerating diet -however according to daughter at bedside not eating  well    OBJECTIVE:    /73   Pulse 69   Temp 98.2 °F (36.8 °C)   Resp 18   Ht 4' 11\" (1.499 m)   Wt 134 lb (60.8 kg)   SpO2 94%   BMI 27.06 kg/m²     General: NAD/ female  HEENT: Anicteric sclera/moist oral mucosa  Neck: Trachea midline  Chest: Symmetric excursion/lnonabored respirations  Abd.: Soft and nontender  Extr.:  Decreased muscle tone and bulk throughout, consistent with age and condition  Skin: Warm and dry        DATA:    KUB images reviewed/report read- no ileus       Lab Results   Component Value Date    WBC 9.0 06/23/2022    RBC 3.46 06/23/2022    HGB 11.3 06/23/2022    HCT 34.0 06/23/2022    MCV 98.3 06/23/2022    MCH 32.7 06/23/2022    MCHC 33.2 06/23/2022    RDW 12.4 06/23/2022     06/23/2022    MPV 9.7 06/23/2022     Lab Results   Component Value Date     06/23/2022    K 3.8 06/23/2022     06/23/2022    CO2 23 06/23/2022    BUN 7 06/23/2022    CREATININE 0.5 06/23/2022    CALCIUM 8.7 06/23/2022    PROT 6.4 06/23/2022    LABALBU 3.8 06/23/2022    BILITOT 1.3 06/23/2022    ALKPHOS 77 06/23/2022    AST 20 06/23/2022    ALT 8 06/23/2022     Lab Results   Component Value Date    LIPASE 19 06/22/2022     No results found for: AMYLASE      ASSESSMENT/PLAN:  Patient Active Problem List   Diagnosis    Osteoarthritis of both shoulders    DDD (degenerative disc disease), cervical    Cervical facet syndrome    Somatic dysfunction bilateral shoulders    Subarachnoid hemorrhage (HCC)    Type 2 diabetes mellitus (HCC)    Periorbital ecchymosis of right eye    Chronic pain of both shoulders    DDD (degenerative disc disease), lumbar    Lumbosacral spondylosis without myelopathy    Spinal stenosis of lumbar region    Pain of both shoulder joints    Cervicalgia    Chronic, continuous use of opioids    Neuralgia and neuritis    Cecal volvulus (HCC)    Anemia    Hyperlipidemia with target low density lipoprotein (LDL) cholesterol less than 70 mg/dL    Ischemic heart disease     1. Abdominal pain/cecal volvulus  -Successful decompressive colonoscopy 6/22  -Diet appears tolerated. Okay to be discharged from GI POV. Movantik on discharge -prescription placed on chart  Follow-up in 2 to 3 weeks after discharge. Family/patient to call for appointment and with questions/concerns at [phone]. Above has been discussed with the patient and her daughter at bedside  - all questions answered to their satisfaction. They are agreeable with the plan and wished to proceed with endoscopy as described. Thank you for the opportunity to participate in the care of Mrs. Tasha Lewis. Tucker Bustamante MD  6/23/2022  12:47 PM    NOTE:  This report was transcribed using voice recognition software. Every effort was made to ensure accuracy; however, inadvertent computerized transcription errors may be present.

## 2022-06-23 NOTE — PROGRESS NOTES
advance diet  - Monitor abdominal exam  - No current plan for surgical intervention, will monitor for recurrence    Electronically signed by Selina Kemp MD on 6/23/2022 at 8:23 AM    Surgery Progress Note            Chief complaint:   Chief Complaint   Patient presents with    Abdominal Pain     right sided for past 2 days. comes and goes.   when it returns it is an '8'      Patient Active Problem List   Diagnosis    Osteoarthritis of both shoulders    DDD (degenerative disc disease), cervical    Cervical facet syndrome    Somatic dysfunction bilateral shoulders    Subarachnoid hemorrhage (HCC)    Type 2 diabetes mellitus (HCC)    Periorbital ecchymosis of right eye    Chronic pain of both shoulders    DDD (degenerative disc disease), lumbar    Lumbosacral spondylosis without myelopathy    Spinal stenosis of lumbar region    Pain of both shoulder joints    Cervicalgia    Chronic, continuous use of opioids    Neuralgia and neuritis    Cecal volvulus (HCC)    Anemia    Hyperlipidemia with target low density lipoprotein (LDL) cholesterol less than 70 mg/dL    Ischemic heart disease       S: as above    O:   Vitals:    06/23/22 0630   BP: 107/73   Pulse: 69   Resp:    Temp: 98.2 °F (36.8 °C)   SpO2: 94%       Intake/Output Summary (Last 24 hours) at 6/23/2022 1027  Last data filed at 6/23/2022 0412  Gross per 24 hour   Intake 1130 ml   Output 725 ml   Net 405 ml           Labs:  Lab Results   Component Value Date    WBC 9.0 06/23/2022    WBC 11.0 06/22/2022    WBC 8.2 07/12/2018    HGB 11.3 06/23/2022    HGB 12.1 06/22/2022    HGB 13.3 07/12/2018    HCT 34.0 06/23/2022    HCT 36.3 06/22/2022    HCT 38.0 07/12/2018     Lab Results   Component Value Date    CREATININE 0.5 06/23/2022    BUN 7 06/23/2022     06/23/2022    K 3.8 06/23/2022     06/23/2022    CO2 23 06/23/2022     Lab Results   Component Value Date    LIPASE 19 06/22/2022         Physical exam:   /73   Pulse 69 Temp 98.2 °F (36.8 °C)   Resp 18   Ht 4' 11\" (1.499 m)   Wt 134 lb (60.8 kg)   SpO2 94%   BMI 27.06 kg/m²   General appearance: NAD  Head: NCAT  Neck: supple, no masses  Lungs: equal chest rise bilateral  Heart: S1S2 present  Abdomen: soft, nontender, nondistended  Skin; no lesions  Gu: no cva tenderness  Extremities: extremities normal, atraumatic, no cyanosis or edema    A:  Cecal volvulus s/p decompressive scope    P: will advance diet and if tolerates can d/c    Zeyad Kathleen MD, MD  6/23/2022

## 2022-06-23 NOTE — PROGRESS NOTES
Subjective: Had decompression colonoscopy did well. Taking p.o.  Getting discharged home today. Chart reviewed patient just discharged occasions reviewed and put back in her home medications as prior to admission  Objective:    /73   Pulse 69   Temp 98.2 °F (36.8 °C)   Resp 18   Ht 4' 11\" (1.499 m)   Wt 134 lb (60.8 kg)   SpO2 94%   BMI 27.06 kg/m²   No further exam  CBC with Differential:    Lab Results   Component Value Date    WBC 9.0 06/23/2022    RBC 3.46 06/23/2022    HGB 11.3 06/23/2022    HCT 34.0 06/23/2022     06/23/2022    MCV 98.3 06/23/2022    MCH 32.7 06/23/2022    MCHC 33.2 06/23/2022    RDW 12.4 06/23/2022    LYMPHOPCT 18.8 06/23/2022    MONOPCT 8.0 06/23/2022    BASOPCT 0.3 06/23/2022    MONOSABS 0.72 06/23/2022    LYMPHSABS 1.69 06/23/2022    EOSABS 1.07 06/23/2022    BASOSABS 0.03 06/23/2022     CMP:    Lab Results   Component Value Date     06/23/2022    K 3.8 06/23/2022     06/23/2022    CO2 23 06/23/2022    BUN 7 06/23/2022    CREATININE 0.5 06/23/2022    GFRAA >60 06/23/2022    LABGLOM >60 06/23/2022    GLUCOSE 106 06/23/2022    PROT 6.4 06/23/2022    LABALBU 3.8 06/23/2022    CALCIUM 8.7 06/23/2022    BILITOT 1.3 06/23/2022    ALKPHOS 77 06/23/2022    AST 20 06/23/2022    ALT 8 06/23/2022        Assessment:    Principal Problem:    Cecal volvulus (HCC)  Active Problems:    Type 2 diabetes mellitus (HCC)    Neuralgia and neuritis  Resolved Problems:    * No resolved hospital problems.  *        Plan:  Gage Steiner was discharged follow-up with PCP in 1 week    Justyn Beckham MD  3:32 PM  6/23/2022

## 2022-12-17 ENCOUNTER — HOSPITAL ENCOUNTER (INPATIENT)
Age: 87
LOS: 3 days | Discharge: HOSPICE | DRG: 872 | End: 2022-12-20
Attending: EMERGENCY MEDICINE | Admitting: INTERNAL MEDICINE
Payer: MEDICARE

## 2022-12-17 ENCOUNTER — APPOINTMENT (OUTPATIENT)
Dept: CT IMAGING | Age: 87
DRG: 872 | End: 2022-12-17
Payer: MEDICARE

## 2022-12-17 DIAGNOSIS — K83.8 INTRAHEPATIC BILE DUCT DILATION: ICD-10-CM

## 2022-12-17 DIAGNOSIS — K52.9 COLOPROCTITIS: ICD-10-CM

## 2022-12-17 DIAGNOSIS — K59.03 DRUG-INDUCED CONSTIPATION: ICD-10-CM

## 2022-12-17 DIAGNOSIS — K80.20 CALCULUS OF GALLBLADDER WITHOUT CHOLECYSTITIS WITHOUT OBSTRUCTION: ICD-10-CM

## 2022-12-17 DIAGNOSIS — K56.41 FECAL IMPACTION IN RECTUM (HCC): Primary | ICD-10-CM

## 2022-12-17 LAB
ALBUMIN SERPL-MCNC: 3.9 G/DL (ref 3.5–5.2)
ALP BLD-CCNC: 80 U/L (ref 35–104)
ALT SERPL-CCNC: 8 U/L (ref 0–32)
ANION GAP SERPL CALCULATED.3IONS-SCNC: 11 MMOL/L (ref 7–16)
ANISOCYTOSIS: ABNORMAL
AST SERPL-CCNC: 21 U/L (ref 0–31)
BACTERIA: ABNORMAL /HPF
BASOPHILS ABSOLUTE: 0 E9/L (ref 0–0.2)
BASOPHILS RELATIVE PERCENT: 0.2 % (ref 0–2)
BILIRUB SERPL-MCNC: 0.8 MG/DL (ref 0–1.2)
BILIRUBIN URINE: NEGATIVE
BLOOD, URINE: ABNORMAL
BUN BLDV-MCNC: 12 MG/DL (ref 6–23)
BURR CELLS: ABNORMAL
CALCIUM SERPL-MCNC: 9 MG/DL (ref 8.6–10.2)
CHLORIDE BLD-SCNC: 101 MMOL/L (ref 98–107)
CLARITY: ABNORMAL
CO2: 25 MMOL/L (ref 22–29)
COLOR: YELLOW
CREAT SERPL-MCNC: 0.4 MG/DL (ref 0.5–1)
EOSINOPHILS ABSOLUTE: 0 E9/L (ref 0.05–0.5)
EOSINOPHILS RELATIVE PERCENT: 0.1 % (ref 0–6)
GFR SERPL CREATININE-BSD FRML MDRD: >60 ML/MIN/1.73
GLUCOSE BLD-MCNC: 176 MG/DL (ref 74–99)
GLUCOSE URINE: 100 MG/DL
HCT VFR BLD CALC: 36.5 % (ref 34–48)
HEMOGLOBIN: 12.5 G/DL (ref 11.5–15.5)
KETONES, URINE: 40 MG/DL
LACTIC ACID: 1.9 MMOL/L (ref 0.5–2.2)
LEUKOCYTE ESTERASE, URINE: NEGATIVE
LIPASE: 16 U/L (ref 13–60)
LYMPHOCYTES ABSOLUTE: 0.38 E9/L (ref 1.5–4)
LYMPHOCYTES RELATIVE PERCENT: 2.6 % (ref 20–42)
MCH RBC QN AUTO: 32.7 PG (ref 26–35)
MCHC RBC AUTO-ENTMCNC: 34.2 % (ref 32–34.5)
MCV RBC AUTO: 95.5 FL (ref 80–99.9)
METER GLUCOSE: 128 MG/DL (ref 74–99)
METER GLUCOSE: 147 MG/DL (ref 74–99)
METER GLUCOSE: 151 MG/DL (ref 74–99)
MONOCYTES ABSOLUTE: 0.51 E9/L (ref 0.1–0.95)
MONOCYTES RELATIVE PERCENT: 3.5 % (ref 2–12)
NEUTROPHILS ABSOLUTE: 11.94 E9/L (ref 1.8–7.3)
NEUTROPHILS RELATIVE PERCENT: 93.9 % (ref 43–80)
NITRITE, URINE: NEGATIVE
OVALOCYTES: ABNORMAL
PDW BLD-RTO: 13.3 FL (ref 11.5–15)
PH UA: 8 (ref 5–9)
PLATELET # BLD: 204 E9/L (ref 130–450)
PMV BLD AUTO: 11.2 FL (ref 7–12)
POIKILOCYTES: ABNORMAL
POLYCHROMASIA: ABNORMAL
POTASSIUM REFLEX MAGNESIUM: 4.1 MMOL/L (ref 3.5–5)
PROTEIN UA: NEGATIVE MG/DL
RBC # BLD: 3.82 E12/L (ref 3.5–5.5)
RBC UA: ABNORMAL /HPF (ref 0–2)
REASON FOR REJECTION: NORMAL
REJECTED TEST: NORMAL
SODIUM BLD-SCNC: 137 MMOL/L (ref 132–146)
SPECIFIC GRAVITY UA: 1.01 (ref 1–1.03)
TEAR DROP CELLS: ABNORMAL
TOTAL PROTEIN: 6.3 G/DL (ref 6.4–8.3)
UROBILINOGEN, URINE: 0.2 E.U./DL
WBC # BLD: 12.7 E9/L (ref 4.5–11.5)
WBC UA: ABNORMAL /HPF (ref 0–5)

## 2022-12-17 PROCEDURE — 99222 1ST HOSP IP/OBS MODERATE 55: CPT | Performed by: SURGERY

## 2022-12-17 PROCEDURE — 81001 URINALYSIS AUTO W/SCOPE: CPT

## 2022-12-17 PROCEDURE — 83605 ASSAY OF LACTIC ACID: CPT

## 2022-12-17 PROCEDURE — 82962 GLUCOSE BLOOD TEST: CPT

## 2022-12-17 PROCEDURE — 74177 CT ABD & PELVIS W/CONTRAST: CPT

## 2022-12-17 PROCEDURE — 85025 COMPLETE CBC W/AUTO DIFF WBC: CPT

## 2022-12-17 PROCEDURE — 80053 COMPREHEN METABOLIC PANEL: CPT

## 2022-12-17 PROCEDURE — 6360000002 HC RX W HCPCS: Performed by: INTERNAL MEDICINE

## 2022-12-17 PROCEDURE — 1200000000 HC SEMI PRIVATE

## 2022-12-17 PROCEDURE — C9113 INJ PANTOPRAZOLE SODIUM, VIA: HCPCS | Performed by: INTERNAL MEDICINE

## 2022-12-17 PROCEDURE — 6360000002 HC RX W HCPCS: Performed by: NURSE PRACTITIONER

## 2022-12-17 PROCEDURE — 6360000004 HC RX CONTRAST MEDICATION: Performed by: RADIOLOGY

## 2022-12-17 PROCEDURE — 83690 ASSAY OF LIPASE: CPT

## 2022-12-17 PROCEDURE — 87040 BLOOD CULTURE FOR BACTERIA: CPT

## 2022-12-17 PROCEDURE — 6370000000 HC RX 637 (ALT 250 FOR IP): Performed by: SURGERY

## 2022-12-17 PROCEDURE — 36415 COLL VENOUS BLD VENIPUNCTURE: CPT

## 2022-12-17 PROCEDURE — 99285 EMERGENCY DEPT VISIT HI MDM: CPT

## 2022-12-17 PROCEDURE — 2580000003 HC RX 258: Performed by: INTERNAL MEDICINE

## 2022-12-17 PROCEDURE — 6370000000 HC RX 637 (ALT 250 FOR IP): Performed by: INTERNAL MEDICINE

## 2022-12-17 PROCEDURE — 2500000003 HC RX 250 WO HCPCS: Performed by: INTERNAL MEDICINE

## 2022-12-17 RX ORDER — ATORVASTATIN CALCIUM 20 MG/1
20 TABLET, FILM COATED ORAL NIGHTLY
Status: DISCONTINUED | OUTPATIENT
Start: 2022-12-17 | End: 2022-12-20 | Stop reason: HOSPADM

## 2022-12-17 RX ORDER — SODIUM CHLORIDE 0.9 % (FLUSH) 0.9 %
5-40 SYRINGE (ML) INJECTION PRN
Status: DISCONTINUED | OUTPATIENT
Start: 2022-12-17 | End: 2022-12-20 | Stop reason: HOSPADM

## 2022-12-17 RX ORDER — POLYETHYLENE GLYCOL 3350 17 G/17G
17 POWDER, FOR SOLUTION ORAL DAILY PRN
Status: DISCONTINUED | OUTPATIENT
Start: 2022-12-17 | End: 2022-12-20 | Stop reason: HOSPADM

## 2022-12-17 RX ORDER — PANTOPRAZOLE SODIUM 40 MG/10ML
40 INJECTION, POWDER, LYOPHILIZED, FOR SOLUTION INTRAVENOUS DAILY
Status: DISCONTINUED | OUTPATIENT
Start: 2022-12-17 | End: 2022-12-20 | Stop reason: HOSPADM

## 2022-12-17 RX ORDER — POTASSIUM CHLORIDE 7.45 MG/ML
10 INJECTION INTRAVENOUS PRN
Status: DISCONTINUED | OUTPATIENT
Start: 2022-12-17 | End: 2022-12-20 | Stop reason: HOSPADM

## 2022-12-17 RX ORDER — POTASSIUM CHLORIDE 20 MEQ/1
40 TABLET, EXTENDED RELEASE ORAL PRN
Status: DISCONTINUED | OUTPATIENT
Start: 2022-12-17 | End: 2022-12-20 | Stop reason: HOSPADM

## 2022-12-17 RX ORDER — FUROSEMIDE 20 MG/1
20 TABLET ORAL 2 TIMES DAILY
Status: DISCONTINUED | OUTPATIENT
Start: 2022-12-17 | End: 2022-12-18

## 2022-12-17 RX ORDER — INSULIN LISPRO 100 [IU]/ML
0-4 INJECTION, SOLUTION INTRAVENOUS; SUBCUTANEOUS
Status: DISCONTINUED | OUTPATIENT
Start: 2022-12-17 | End: 2022-12-18

## 2022-12-17 RX ORDER — MINERAL OIL 100 G/100G
1 OIL RECTAL PRN
Status: ACTIVE | OUTPATIENT
Start: 2022-12-17 | End: 2022-12-19

## 2022-12-17 RX ORDER — LISINOPRIL 10 MG/1
10 TABLET ORAL 2 TIMES DAILY
Status: DISCONTINUED | OUTPATIENT
Start: 2022-12-17 | End: 2022-12-20 | Stop reason: HOSPADM

## 2022-12-17 RX ORDER — HYDROCODONE BITARTRATE AND ACETAMINOPHEN 7.5; 325 MG/1; MG/1
1 TABLET ORAL EVERY 6 HOURS PRN
Status: DISCONTINUED | OUTPATIENT
Start: 2022-12-17 | End: 2022-12-19

## 2022-12-17 RX ORDER — DEXTROSE MONOHYDRATE 100 MG/ML
INJECTION, SOLUTION INTRAVENOUS CONTINUOUS PRN
Status: DISCONTINUED | OUTPATIENT
Start: 2022-12-17 | End: 2022-12-20 | Stop reason: HOSPADM

## 2022-12-17 RX ORDER — ENOXAPARIN SODIUM 100 MG/ML
40 INJECTION SUBCUTANEOUS DAILY
Status: DISCONTINUED | OUTPATIENT
Start: 2022-12-17 | End: 2022-12-20 | Stop reason: HOSPADM

## 2022-12-17 RX ORDER — SODIUM CHLORIDE AND POTASSIUM CHLORIDE 300; 900 MG/100ML; MG/100ML
INJECTION, SOLUTION INTRAVENOUS CONTINUOUS
Status: DISCONTINUED | OUTPATIENT
Start: 2022-12-17 | End: 2022-12-18

## 2022-12-17 RX ORDER — SODIUM CHLORIDE 9 MG/ML
INJECTION, SOLUTION INTRAVENOUS PRN
Status: DISCONTINUED | OUTPATIENT
Start: 2022-12-17 | End: 2022-12-20 | Stop reason: HOSPADM

## 2022-12-17 RX ORDER — DOCUSATE SODIUM 100 MG/1
100 CAPSULE, LIQUID FILLED ORAL DAILY
Status: DISCONTINUED | OUTPATIENT
Start: 2022-12-17 | End: 2022-12-18

## 2022-12-17 RX ORDER — POLYETHYLENE GLYCOL 3350 17 G/17G
17 POWDER, FOR SOLUTION ORAL 2 TIMES DAILY
Qty: 510 G | Refills: 0 | Status: SHIPPED | OUTPATIENT
Start: 2022-12-17 | End: 2022-12-19 | Stop reason: HOSPADM

## 2022-12-17 RX ORDER — ACETAMINOPHEN 650 MG/1
650 SUPPOSITORY RECTAL EVERY 6 HOURS PRN
Status: DISCONTINUED | OUTPATIENT
Start: 2022-12-17 | End: 2022-12-20 | Stop reason: HOSPADM

## 2022-12-17 RX ORDER — SODIUM CHLORIDE 0.9 % (FLUSH) 0.9 %
5-40 SYRINGE (ML) INJECTION EVERY 12 HOURS SCHEDULED
Status: DISCONTINUED | OUTPATIENT
Start: 2022-12-17 | End: 2022-12-20 | Stop reason: HOSPADM

## 2022-12-17 RX ORDER — SENNA AND DOCUSATE SODIUM 50; 8.6 MG/1; MG/1
1 TABLET, FILM COATED ORAL DAILY
Qty: 30 TABLET | Refills: 0 | Status: SHIPPED | OUTPATIENT
Start: 2022-12-17 | End: 2022-12-19 | Stop reason: SDUPTHER

## 2022-12-17 RX ORDER — ACETAMINOPHEN 325 MG/1
650 TABLET ORAL EVERY 6 HOURS PRN
Status: DISCONTINUED | OUTPATIENT
Start: 2022-12-17 | End: 2022-12-20 | Stop reason: HOSPADM

## 2022-12-17 RX ORDER — ASPIRIN 81 MG/1
81 TABLET ORAL DAILY
Status: DISCONTINUED | OUTPATIENT
Start: 2022-12-17 | End: 2022-12-20 | Stop reason: HOSPADM

## 2022-12-17 RX ORDER — HYDRALAZINE HYDROCHLORIDE 20 MG/ML
5 INJECTION INTRAMUSCULAR; INTRAVENOUS EVERY 4 HOURS PRN
Status: DISCONTINUED | OUTPATIENT
Start: 2022-12-17 | End: 2022-12-20 | Stop reason: HOSPADM

## 2022-12-17 RX ORDER — LIDOCAINE 50 MG/G
1 PATCH TOPICAL DAILY
COMMUNITY

## 2022-12-17 RX ORDER — METRONIDAZOLE 500 MG/100ML
500 INJECTION, SOLUTION INTRAVENOUS EVERY 8 HOURS
Status: DISCONTINUED | OUTPATIENT
Start: 2022-12-17 | End: 2022-12-20 | Stop reason: HOSPADM

## 2022-12-17 RX ORDER — ONDANSETRON 4 MG/1
4 TABLET, ORALLY DISINTEGRATING ORAL EVERY 8 HOURS PRN
Status: DISCONTINUED | OUTPATIENT
Start: 2022-12-17 | End: 2022-12-20 | Stop reason: HOSPADM

## 2022-12-17 RX ORDER — ONDANSETRON 2 MG/ML
4 INJECTION INTRAMUSCULAR; INTRAVENOUS EVERY 6 HOURS PRN
Status: DISCONTINUED | OUTPATIENT
Start: 2022-12-17 | End: 2022-12-20 | Stop reason: HOSPADM

## 2022-12-17 RX ORDER — MAGNESIUM SULFATE 1 G/100ML
1000 INJECTION INTRAVENOUS PRN
Status: DISCONTINUED | OUTPATIENT
Start: 2022-12-17 | End: 2022-12-20 | Stop reason: HOSPADM

## 2022-12-17 RX ADMIN — PANTOPRAZOLE SODIUM 40 MG: 40 INJECTION, POWDER, FOR SOLUTION INTRAVENOUS at 13:05

## 2022-12-17 RX ADMIN — METOPROLOL TARTRATE 25 MG: 25 TABLET, FILM COATED ORAL at 20:22

## 2022-12-17 RX ADMIN — CEFTRIAXONE 1000 MG: 1 INJECTION, POWDER, FOR SOLUTION INTRAMUSCULAR; INTRAVENOUS at 13:05

## 2022-12-17 RX ADMIN — METRONIDAZOLE 500 MG: 500 INJECTION, SOLUTION INTRAVENOUS at 20:24

## 2022-12-17 RX ADMIN — DOCUSATE SODIUM 100 MG: 100 CAPSULE, LIQUID FILLED ORAL at 18:38

## 2022-12-17 RX ADMIN — BISACODYL 10 MG: 5 TABLET, COATED ORAL at 18:39

## 2022-12-17 RX ADMIN — Medication 10 ML: at 13:09

## 2022-12-17 RX ADMIN — METOPROLOL TARTRATE 25 MG: 25 TABLET, FILM COATED ORAL at 13:05

## 2022-12-17 RX ADMIN — POTASSIUM CHLORIDE AND SODIUM CHLORIDE: 900; 300 INJECTION, SOLUTION INTRAVENOUS at 13:07

## 2022-12-17 RX ADMIN — METRONIDAZOLE 500 MG: 500 INJECTION, SOLUTION INTRAVENOUS at 13:07

## 2022-12-17 RX ADMIN — IOPAMIDOL 75 ML: 755 INJECTION, SOLUTION INTRAVENOUS at 06:03

## 2022-12-17 ASSESSMENT — LIFESTYLE VARIABLES
HOW OFTEN DO YOU HAVE A DRINK CONTAINING ALCOHOL: 4 OR MORE TIMES A WEEK
HOW MANY STANDARD DRINKS CONTAINING ALCOHOL DO YOU HAVE ON A TYPICAL DAY: 1 OR 2

## 2022-12-17 ASSESSMENT — PAIN SCALES - GENERAL
PAINLEVEL_OUTOF10: 8
PAINLEVEL_OUTOF10: 10

## 2022-12-17 ASSESSMENT — PAIN - FUNCTIONAL ASSESSMENT
PAIN_FUNCTIONAL_ASSESSMENT: 0-10
PAIN_FUNCTIONAL_ASSESSMENT: 0-10

## 2022-12-17 ASSESSMENT — PAIN DESCRIPTION - LOCATION
LOCATION: RECTUM
LOCATION: SHOULDER

## 2022-12-17 NOTE — CONSULTS
[facility]  Dr. Salazar Robbins M.D., Dr. Betsy Jean Baptiste M.D., Dr. Myah Alfonso D.O., Dr. Jose Maria León M.D. Irma Green D.O., GI fellow        Patient Name: Lary Green  MRN: [id number]  : 1935 (80 y.o. female)  Allergies: is allergic to latex, adhesive tape, glimepiride, and tetanus toxoids. Date of Service: 2022       Reason for Consultation:  Fecal impaction    HISTORY OF PRESENT ILLNESS:      The patient is a 80 y.o. female who is known to our service with chronic opiate induced constipation presents with a recurrent fecal impaction and abdominal pain. In  of this year the patient had a decompressive colonoscopy with Dr. Mellisa Celis. Family states the patient has not had a BM in 3-4 days. CT imaging showed amn 11.3 cm rectal impaction and proctitis. She had several episodes of fecal incontinence in the ED. REVIEW OF SYSTEMS:   Constitutional: Denies fever, chills, or unintentional weight loss. HEENT: Denies double or blurry vision, headaches, ear pain or ringing in the ears. No drainage from the ears, nose or throat. Cardiovascular: Denies any chest pain, irregular heartbeats, or palpitations. Respiratory: Denies shortness of breath, coughing, sputum production, hemoptysis, or wheezing. Gastrointestinal: + constipation, Abdominal pain. Denies nausea, vomiting, diarrhea, black or bloody stools. Genitourinary: Denies any urinary urgency, frequency, hematuria. Voiding without difficulty. Endocrine: Denies sensitivity to heat or cold. Denies changes in hair, skin or nails. Denies excessive thirst, hunger or going to the bathroom more frequently than usual.  Extremities: Denies swelling or calf pain. Musculoskeletal: Denies muscle or joint pain, stiffness, arthritis, gout, or instability. Dermatology / Skin: Denies any rashes, ulcers, or excoriations. Denies bruising.    Neurology: Denies any bowel or bladder incontinence, headache or focal neurological deficits. No weakness or paresthesia. Denies numbness or tingling in the hands or feet. Psychiatric: Denies nervousness/anxiety, depression or memory loss.       Past Medical History:  Past Medical History:   Diagnosis Date    Arthritis     CAD (coronary artery disease)     2012    Hyperlipidemia     Hypertension     Latex allergy     Myocardial infarction [facility]) 2012    Shoulder pain     Sigmoid volvulus ([facility] [address]     june 2022       Past Surgical History:  Past Surgical History:   Procedure Laterality Date    ANESTHESIA NERVE BLOCK Bilateral 6/25/2019    BILATERAL SUPRASCAPULAR BLOCK UNDER ULTRASOUND  (CPT: 94314) performed by Sean Zavala MD at [facility] Bilateral 10/1/2019    BILATERAL [address] performed by Sean Zavala MD at [address] [place] [address]  june 2012    CARDIAC SURGERY  june 2012    triple bypass    CARPAL TUNNEL RELEASE      CARPAL TUNNEL RELEASE Right 04/25/2018    COLONOSCOPY N/A 6/22/2022    COLONOSCOPY FLEXIBLE W/ DECOMPRESSION performed by Charley Hernandez MD at [place] [address]  2012    EYE SURGERY Bilateral     cataracts    NERVE BLOCK  11/24/14    bilateral shoulder injection #1    NERVE BLOCK Bilateral 12/1/2014    alexys shoulder injections  #2    NERVE BLOCK N/A 1/7/2015    cervical epidural  #1    NERVE BLOCK N/A 1 21 15    cerv ep #2    NERVE BLOCK Bilateral 01/23/2017    Bilateral shoulder injection #1    NERVE BLOCK Bilateral 06/25/2019    suprascapular block with sedation    NERVE BLOCK Bilateral 10/01/2019    suprascapular     OTHER SURGICAL HISTORY Bilateral 03/17/2020    bilateral suprascapular nerve pulsed RFA under ultrasound guidance    PAIN MANAGEMENT PROCEDURE Bilateral 3/17/2020    BILATERAL SUPRASCAPULAR NERVE RADIOFREQUENCY ABLATION WITH ULTRASOUND (CPT 17137 [id number]) SEDATION performed by Sean Zavala MD at . [address] MEDIAN N/CARPAL TUNNEL SURG Right 4/25/2018    RIGHT HAND CARPAL TUNNEL RELEASE performed by Jacoby Cobian MD at [address] Medications:  Prior to Admission medications    Medication Sig Start Date End Date Taking? Authorizing Provider   naloxegol (MOVANTIK) 25 MG TABS tablet Take 1 tablet by mouth every morning (before breakfast) 12/17/22  Yes Ryley Audrene Sprague, DO   polyethylene glycol (GLYCOLAX) 17 GM/SCOOP powder Take 17 g by mouth 2 times daily for 15 days 12/17/22 1/1/23 Yes Ton Owusu, DO   sennosides-docusate sodium (SENOKOT-S) 8.6-50 MG tablet Take 1 tablet by mouth daily 12/17/22 1/16/23 Yes Ton Owusu,    lidocaine (LIDODERM) 5 % Place 1 patch onto the skin daily 12 hours on, 12 hours off. Yes Historical Provider, MD   furosemide (LASIX) 20 MG tablet Take 20 mg by mouth 2 times daily    Historical Provider, MD   atorvastatin (LIPITOR) 20 MG tablet TAKE ONE TABLET BY MOUTH EVERY DAY FOR CHOLESTEROL 9/1/19   Historical Provider, MD   HYDROcodone-acetaminophen ([facility]) 7.5-325 MG per tablet 6 times daily. Historical Provider, MD   diphenhydrAMINE (BENADRYL) 25 MG capsule Take 25 mg by mouth every 6 hours as needed for Itching    Historical Provider, MD   aspirin 81 MG tablet Take 81 mg by mouth daily     Historical Provider, MD   metoprolol (LOPRESSOR) 25 MG tablet Take 12.5 mg by mouth 2 times daily. Historical Provider, MD   LISINOPRIL PO Take 10 mg by mouth 2 times daily. Historical Provider, MD       Allergies: Latex, Adhesive tape, Glimepiride, and Tetanus toxoids    Social History:  Social History     Socioeconomic History    Marital status:       Spouse name: Not on file    Number of children: Not on file    Years of education: Not on file    Highest education level: Not on file   Occupational History    Not on file   Tobacco Use    Smoking status: Never    Smokeless tobacco: Never   Vaping Use    Vaping Use: Never used   Substance and Sexual Activity    Alcohol use: Yes     Alcohol/week: 7.0 standard drinks     Types: 7 Shots of liquor per week     Comment: \"a shot every evening\"    Drug use: No    Sexual activity: Not Currently     Partners: Male   Other Topics Concern    Not on file   Social History Narrative    Not on file     Social Determinants of Health     Financial Resource Strain: Not on file   Food Insecurity: Not on file   Transportation Needs: Not on file   Physical Activity: Not on file   Stress: Not on file   Social Connections: Not on file   Intimate Partner Violence: Not on file   Housing Stability: Not on file       Family History:  Family History   Problem Relation Age of Onset    Cancer Mother     Diabetes Mother     Hypertension Mother     Hypertension Father     Heart Failure Father     Diabetes Sister     Diabetes Sister     Hypertension Sister     Arthritis Other          PHYSICAL EXAM:  Vital Signs: BP (!) 151/72   Pulse (!) 113   Temp 99.1 °F (37.3 °C) (Oral)   Resp 18   Ht 4' 11\" (1.499 m)   Wt 140 lb (63.5 kg)   SpO2 96%   BMI 28.28 kg/m²   GENERAL APPEARANCE:  awake, alert, oriented, cooperative, and in no acute distress  EYES:  Lids and lashes normal, PERRLA, EOMI, sclera clear, conjunctiva normal  HENT:  Normocephalic, without obvious abnormality, atramatic, sinuses nontender on palpation, external ears without lesions, oral pharynx with moist mucus membranes, tonsils without erythema or exudates  NECK:  Supple with no carotid bruits, JVD or thyromegaly. No cervical adenopathy  LUNGS:  Clear to auscultation bilaterally with no wheezes, rales or rhonchi. No increased work of breathing, good air exchange. CARDIOVASCULAR: Regular rate and rhythm, no murmur  ABDOMEN:  normal bowel sounds in all 4 quadrants, soft, non-distended, non-tender, no masses palpated, no hepatosplenomegally  MUSCULOSKELETAL:  There is no redness, warmth, or swelling of the joints. Full range of motion noted.   Motor strength is 5 out of 5 all extremities bilaterally. Tone is normal.  EXTREMITIES: No edema, 2+ pulses bilaterally (radial and dorsalis pedis)  NEUROLOGIC:  Awake, alert, oriented to name, place and time. Cranial nerves II-XII are grossly intact. SKIN: Normal skin color, texture, and turgor. There is no redness, warmth, or swelling. No bruising or bleeding, no mottling. No rashes and no jaundice. No pressure ulcers noted on visible skin. PSYCH: Affect, behavior and insight are all within normal limits.       DATA:  Results for orders placed or performed during the hospital encounter of 12/17/22   CBC with Auto Differential   Result Value Ref Range    WBC 12.7 (H) 4.5 - 11.5 E9/L    RBC 3.82 3.50 - 5.50 E12/L    Hemoglobin 12.5 11.5 - 15.5 g/dL    Hematocrit 36.5 34.0 - 48.0 %    MCV 95.5 80.0 - 99.9 fL    MCH 32.7 26.0 - 35.0 pg    MCHC 34.2 32.0 - 34.5 %    RDW 13.3 11.5 - 15.0 fL    Platelets 773 283 - 709 E9/L    MPV 11.2 7.0 - 12.0 fL    Neutrophils % 93.9 (H) 43.0 - 80.0 %    Lymphocytes % 2.6 (L) 20.0 - 42.0 %    Monocytes % 3.5 2.0 - 12.0 %    Eosinophils % 0.1 0.0 - 6.0 %    Basophils % 0.2 0.0 - 2.0 %    Neutrophils Absolute 11.94 (H) 1.80 - 7.30 E9/L    Lymphocytes Absolute 0.38 (L) 1.50 - 4.00 E9/L    Monocytes Absolute 0.51 0.10 - 0.95 E9/L    Eosinophils Absolute 0.00 (L) 0.05 - 0.50 E9/L    Basophils Absolute 0.00 0.00 - 0.20 E9/L    Anisocytosis 1+     Polychromasia 1+     Poikilocytes 1+     Lenora Cells 1+     Ovalocytes 1+     Tear Drop Cells 1+    Lipase   Result Value Ref Range    Lipase 16 13 - 60 U/L   Lactic Acid   Result Value Ref Range    Lactic Acid 1.9 0.5 - 2.2 mmol/L   Urinalysis with Microscopic   Result Value Ref Range    Color, UA Yellow Straw/Yellow    Clarity, UA SLCLOUDY Clear    Glucose, Ur 100 (A) Negative mg/dL    Bilirubin Urine Negative Negative    Ketones, Urine 40 (A) Negative mg/dL    Specific Gravity, UA 1.015 1.005 - 1.030    Blood, Urine TRACE (A) Negative    pH, UA 8.0 5.0 - 9.0 Protein, UA Negative Negative mg/dL    Urobilinogen, Urine 0.2 <2.0 E.U./dL    Nitrite, Urine Negative Negative    Leukocyte Esterase, Urine Negative Negative    WBC, UA 10-20 (A) 0 - 5 /HPF    RBC, UA NONE 0 - 2 /HPF    Bacteria, UA MANY (A) None Seen /HPF   SPECIMEN REJECTION   Result Value Ref Range    Rejected Test CMPX     Reason for Rejection see below    Comprehensive Metabolic Panel w/ Reflex to MG   Result Value Ref Range    Sodium 137 132 - 146 mmol/L    Potassium reflex Magnesium 4.1 3.5 - 5.0 mmol/L    Chloride 101 98 - 107 mmol/L    CO2 25 22 - 29 mmol/L    Anion Gap 11 7 - 16 mmol/L    Glucose 176 (H) 74 - 99 mg/dL    BUN 12 6 - 23 mg/dL    Creatinine 0.4 (L) 0.5 - 1.0 mg/dL    Est, Glom Filt Rate >60 >=60 mL/min/1.73    Calcium 9.0 8.6 - 10.2 mg/dL    Total Protein 6.3 (L) 6.4 - 8.3 g/dL    Albumin 3.9 3.5 - 5.2 g/dL    Total Bilirubin 0.8 0.0 - 1.2 mg/dL    Alkaline Phosphatase 80 35 - 104 U/L    ALT 8 0 - 32 U/L    AST 21 0 - 31 U/L   POCT Glucose   Result Value Ref Range    Meter Glucose 151 (H) 74 - 99 mg/dL         IMAGING:  CT ABDOMEN PELVIS W IV CONTRAST Additional Contrast? None    Result Date: 12/17/2022  EXAMINATION: CT OF THE ABDOMEN AND PELVIS WITH CONTRAST 12/17/2022 5:55 am TECHNIQUE: CT of the abdomen and pelvis was performed with the administration of intravenous contrast. Multiplanar reformatted images are provided for review. Automated exposure control, iterative reconstruction, and/or weight based adjustment of the mA/kV was utilized to reduce the radiation dose to as low as reasonably achievable. COMPARISON: None.  HISTORY: ORDERING SYSTEM PROVIDED HISTORY: Reported constipation with multiple episodes of watery stool/diarrhea TECHNOLOGIST PROVIDED HISTORY: Additional Contrast?->None Reason for exam:->Reported constipation with multiple episodes of watery stool/diarrhea Decision Support Exception - unselect if not a suspected or confirmed emergency medical condition->Emergency Medical Condition (MA) FINDINGS: Lower Chest: Cardiomegaly. Prosthetic aortic valve. No pericardial effusions. Mild right basilar infiltrate may represent aspiration or other pneumonitis. Organs: Cholelithiasis. Distended gallbladder and moderate intrahepatic and extrahepatic biliary ductal dilatation. The common bile duct is dilated up to 14 mm. No obstructing ampullary mass lesion is identified on CT, however, clinical correlation is recommended. Consider further evaluation by ERCP/MRCP as indicated. 11 mm wall calcified splenic cyst.  Otherwise, the Liver, biliary tree, bilateral adrenal glands, bilateral kidneys, spleen and pancreas are normal. GI/Bowel: 11.3 cm rectal stool impaction. No overt obstruction. Pelvis: No adnexal mass. Trace intrapelvic free fluid of uncertain etiology. Perirectal infiltration suggesting proctitis. Peritoneum/Retroperitoneum: No adenopathy or fluid. Bones/Soft Tissues: No acute osseous or soft tissue abnormality. 1. 11.3 cm rectal stool impaction. Perirectal infiltration suggesting proctitis. 2. Cholelithiasis. Distended gallbladder with moderate intrahepatic and extrahepatic biliary ductal dilatation. No ampullary mass lesion is identified on CT. Consider further evaluation by ERCP/MRCP as clinically indicated. 3. Mild right basilar pulmonary infiltrate may represent aspiration or other pneumonitis. RECOMMENDATIONS: Careful clinical correlation and follow up recommended. ASSESSMENT / PLAN:    Opiate Induced Constipation / Fecal Impaction  - Previous history of sigmoid volvulus requiring decompressive colonoscopy. - CT imaging noted  - Question of medication compliance at home  - Appreciate surgery recommendations  - Continue Movantik, Miralax, Colace  - Mineral oil enema ordered per surgery  - Consider adding Amitiza 24 mcg pending clinical course  - Ok to advance diet as tolerated     2.  Acute UTI / Sepsis  - Per admitting     Please see orders for further plan of care.     Swapna Glaser,   12/17/2022 at 2:30 PM

## 2022-12-17 NOTE — H&P
Department of Internal Medicine  History and Physical Examination     Primary Care Physician: Katie Richardson MD   Admitting Physician:  Anayeli Mitchell DO  Admission date and time: 12/17/2022  2:32 AM    Room:  10/10  Admitting diagnosis: Fecal impaction [facility]) [K56.41]    Patient Name: Elisha Mendez  MRN: [id number]    Date of Service: 12/17/2022     Chief Complaint: Abdominal pain    HISTORY OF PRESENT ILLNESS:    Elisha Mendez is an 59-year-old female patient presented to [address] [place] [address] with complaints of abdominal pain and constipation. She has not had a bowel movement in 3 days. ER work-up revealed large fecal impaction with a component of proctitis. Also revealed UTI. BC showed mild leukocytosis with neutrophilic predominance. Metabolic panel was otherwise unremarkable. She met criteria for sepsis with UTI based upon leukocytosis and tachycardia. Case was discussed with ER physician. Patient was previously admitted by Dr. Gideon Funk service. We have requested that the patient be offered to their service before being admitted to us. We have confirmed that they do not wish to admit the patient and we will gladly accept the patient for admission. Patient is seen and examined at bedside with son present. We confirmed the above given history. She is on chronic narcotics in the form of Norco and has recently been transition to fentanyl patches in setting of longstanding shoulder arthritis for the patient previously refused surgery. During her last hospitalization she had decompressive colonoscopy for sigmoid volvulus. She denies any fevers or chills. Denies sick contacts or exposures. No headache or acute neurological symptoms aside from that generalized weakness. No chest pain, palpitations or fluttering. Positive for a longstanding history of coronary artery disease with prior coronary artery bypass grafting which has been stable for many years on current medication.   No shortness of breath at rest or exertion, cough or URI complaints. Lower abdominal pain and cramping reported with constipation. No nausea, no vomiting, no bowel changes. She was intolerant of soapsuds enema and has refused digital disimpaction in the emergency department. He admits to dysuria and frequency changes with no hematuria.         PAST MEDICAL Hx:  Past Medical History:   Diagnosis Date    Arthritis     CAD (coronary artery disease)     2012    Hyperlipidemia     Hypertension     Latex allergy     Myocardial infarction [facility]) 2012    Shoulder pain        PAST SURGICAL Hx:   Past Surgical History:   Procedure Laterality Date    ANESTHESIA NERVE BLOCK Bilateral 6/25/2019    BILATERAL SUPRASCAPULAR BLOCK UNDER ULTRASOUND  (CPT: 54885) performed by Anastasiia Thurston MD at [facility] Bilateral 10/1/2019    BILATERAL [address] performed by Anastasiia Thurston MD at [address] [place] [address]  june 2012    CARDIAC SURGERY  june 2012    triple bypass    CARPAL TUNNEL RELEASE      CARPAL TUNNEL RELEASE Right 04/25/2018    COLONOSCOPY N/A 6/22/2022    COLONOSCOPY FLEXIBLE W/ DECOMPRESSION performed by Maddi Newberry MD at [facility]  2012    EYE SURGERY Bilateral     cataracts    NERVE BLOCK  11/24/14    bilateral shoulder injection #1    NERVE BLOCK Bilateral 12/1/2014    alexys shoulder injections  #2    NERVE BLOCK N/A 1/7/2015    cervical epidural  #1    NERVE BLOCK N/A 1 21 15    cerv ep #2    NERVE BLOCK Bilateral 01/23/2017    Bilateral shoulder injection #1    NERVE BLOCK Bilateral 06/25/2019    suprascapular block with sedation    NERVE BLOCK Bilateral 10/01/2019    suprascapular     OTHER SURGICAL HISTORY Bilateral 03/17/2020    bilateral suprascapular nerve pulsed RFA under ultrasound guidance    PAIN MANAGEMENT PROCEDURE Bilateral 3/17/2020    BILATERAL SUPRASCAPULAR NERVE RADIOFREQUENCY ABLATION WITH ULTRASOUND (CPT 63752 [id number]) SEDATION performed by Flako Marin MD at [address] [facility] N/CARPAL TUNNEL SURG Right 4/25/2018    RIGHT HAND CARPAL TUNNEL RELEASE performed by Parviz Layton MD at [address] Hx:  Family History   Problem Relation Age of Onset    Cancer Mother     Diabetes Mother     Hypertension Mother     Hypertension Father     Heart Failure Father     Diabetes Sister     Diabetes Sister     Hypertension Sister     Arthritis Other        HOME MEDICATIONS:  Prior to Admission medications    Medication Sig Start Date End Date Taking? Authorizing Provider   naloxegol (MOVANTIK) 25 MG TABS tablet Take 1 tablet by mouth every morning (before breakfast) 12/17/22  Yes Ryley Rogers Frost, DO   polyethylene glycol (GLYCOLAX) 17 GM/SCOOP powder Take 17 g by mouth 2 times daily for 15 days 12/17/22 1/1/23 Yes Ryley Rogers Frost, DO   sennosides-docusate sodium (SENOKOT-S) 8.6-50 MG tablet Take 1 tablet by mouth daily 12/17/22 1/16/23 Yes Ebony Owusu DO   furosemide (LASIX) 20 MG tablet Take 20 mg by mouth 2 times daily    Historical Provider, MD   atorvastatin (LIPITOR) 20 MG tablet TAKE ONE TABLET BY MOUTH EVERY DAY FOR CHOLESTEROL 9/1/19   Historical Provider, MD   HYDROcodone-acetaminophen (Uri Lay) 7.5-325 MG per tablet hydrocodone 7.5 mg-acetaminophen 325 mg tablet    Historical Provider, MD   diphenhydrAMINE (BENADRYL) 25 MG capsule Take 25 mg by mouth every 6 hours as needed for Itching    Historical Provider, MD   aspirin 81 MG tablet Take 81 mg by mouth daily     Historical Provider, MD   metoprolol (LOPRESSOR) 25 MG tablet Take 12.5 mg by mouth 2 times daily. Historical Provider, MD   LISINOPRIL PO Take 10 mg by mouth 2 times daily. Historical Provider, MD       ALLERGIES:  Latex, Adhesive tape, Glimepiride, and Tetanus toxoids    SOCIAL Hx:  Social History     Socioeconomic History    Marital status:       Spouse name: Not on file    Number of children: Not on file    Years of education: Not on file    Highest education level: Not on file   Occupational History    Not on file   Tobacco Use    Smoking status: Never    Smokeless tobacco: Never   Vaping Use    Vaping Use: Never used   Substance and Sexual Activity    Alcohol use: Yes     Alcohol/week: 7.0 standard drinks     Types: 7 Shots of liquor per week     Comment: \"a shot every evening\"    Drug use: No    Sexual activity: Never   Other Topics Concern    Not on file   Social History Narrative    Not on file     Social Determinants of Health     Financial Resource Strain: Not on file   Food Insecurity: Not on file   Transportation Needs: Not on file   Physical Activity: Not on file   Stress: Not on file   Social Connections: Not on file   Intimate Partner Violence: Not on file   Housing Stability: Not on file     ROS: 12 point review of symptoms was conducted, pertinent positives and negative were reviewed, aside from that all 12 systems were reviewed and negative. PHYSICAL EXAM:  VITALS:  Vitals:    12/17/22 0523   BP: (!) 169/69   Pulse: (!) 111   Resp: 14   Temp:    SpO2: 97%         CONSTITUTIONAL:    Awake, alert, cooperative, chronically ill, mild end Cober appearing . No apparent distress    EYES:    PERRL, EOMI, sclera clear without icterus, conjunctiva normal    ENT:    Normocephalic, atraumatic, sinuses nontender on palpation. External ears without lesions. Oral pharynx with moist mucus membranes. NECK:    Supple, symmetrical, trachea midline, no adenopathy, thyroid symmetric, not enlarged and no tenderness, skin normal, no bruits, no JVD    HEMATOLOGIC/LYMPHATICS:    No cervical lymphadenopathy and no supraclavicular lymphadenopathy    LUNGS:    Symmetric.  No increased work of breathing, diminished bibasilar air exchange, clear to auscultation bilaterally, no wheezes, rhonchi, or rales,     CARDIOVASCULAR:    Normal apical impulse, mildly tachycardic with regular rhythm, S1 and S2. Systolic murmur. ABDOMEN:    Soft, nondistended abdomen. Lower abdominal tenderness to palpation without rebound, guarding or rigidity. Bowel sounds are active in all 4 quadrants     MUSCULOSKELETAL:    There is no redness, warmth, or swelling of the joints. Tone is normal.    NEUROLOGIC:    Awake, alert, oriented to name, place and time. Generally weak with no focal component. Cranial nerves II-XII are grossly intact. Motor is 5 out of 5 bilaterally. SKIN:    Prominent facial and chest varicosities which are apparently chronic. No bruising or bleeding. No redness, warmth, or swelling    EXTREMITIES:    Peripheral pulses present. No significant pitting edema.     LABORATORY DATA:  CBC with Differential:    Lab Results   Component Value Date/Time    WBC 12.7 12/17/2022 03:20 AM    RBC 3.82 12/17/2022 03:20 AM    HGB 12.5 12/17/2022 03:20 AM    HCT 36.5 12/17/2022 03:20 AM     12/17/2022 03:20 AM    MCV 95.5 12/17/2022 03:20 AM    MCH 32.7 12/17/2022 03:20 AM    MCHC 34.2 12/17/2022 03:20 AM    RDW 13.3 12/17/2022 03:20 AM    LYMPHOPCT 2.6 12/17/2022 03:20 AM    MONOPCT 3.5 12/17/2022 03:20 AM    BASOPCT 0.2 12/17/2022 03:20 AM    MONOSABS 0.51 12/17/2022 03:20 AM    LYMPHSABS 0.38 12/17/2022 03:20 AM    EOSABS 0.00 12/17/2022 03:20 AM    BASOSABS 0.00 12/17/2022 03:20 AM     CMP:    Lab Results   Component Value Date/Time     12/17/2022 04:50 AM    K 4.1 12/17/2022 04:50 AM     12/17/2022 04:50 AM    CO2 25 12/17/2022 04:50 AM    BUN 12 12/17/2022 04:50 AM    CREATININE 0.4 12/17/2022 04:50 AM    GFRAA >60 06/23/2022 05:50 AM    LABGLOM >60 12/17/2022 04:50 AM    GLUCOSE 176 12/17/2022 04:50 AM    PROT 6.3 12/17/2022 04:50 AM    LABALBU 3.9 12/17/2022 04:50 AM    CALCIUM 9.0 12/17/2022 04:50 AM    BILITOT 0.8 12/17/2022 04:50 AM    ALKPHOS 80 12/17/2022 04:50 AM    AST 21 12/17/2022 04:50 AM    ALT 8 12/17/2022 04:50 AM       ASSESSMENT:  Sepsis secondary to urinary tract infection  Fecal impaction of 11.3 cm stool segment with associated proctitis  Asymptomatic coronary artery disease  Non-insulin-dependent diabetes mellitus type 2  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 She was unable to tolerate enema or disimpaction in the emergency department. During previous hospitalization she required decompressive colonoscopy due to [employer]. The GI and surgery team will follow. We will cover with Rocephin and metronidazole in the setting of proctitis and UTI. Cultures will be obtained. Symptomatic and supportive care. Beta-blocker will be resumed as the patient is mildly tachycardic in the setting of not receiving this medication last evening or this morning. Lisinopril will be held. As needed antitussives be utilized. She is at her baseline state of health otherwise. She is 80 with multiple comorbidities and would be at at her baseline at least moderate risk for anesthesia and procedure if required. Underlying co-morbidites will be addressed during hospitalization as well. Labs and vital signs will be monitored closely and addressed accordingly. See additional orders for details.      MELQUIADES Pascual CNP  9:35 AM  12/17/2022    Electronically signed by MELQUIADES Pascual CNP on 12/17/22 at 9:35 AM EST

## 2022-12-17 NOTE — CONSULTS
General Surgery Consult    Patient's Name/Date of Birth: Sasha Davis / 1935    Date: December 17, 2022     Consulting Surgeon: Gabe Ramirez M.D.    PCP: Nash Marvin MD     Chief Complaint: rectal pain    HPI:   Sasha Davis is a 80 y.o. female who presents for  evaluation of rectal pain and chronic constipation with chronic opiod use. She has not moved bowels for several days and pain got worse so came to ED.  Timing is constant, radiation to anus, alleviated by nothing and started days ago, severity 2-9/10      Past Medical History:   Diagnosis Date    Arthritis     CAD (coronary artery disease)     2012    Hyperlipidemia     Hypertension     Latex allergy     Myocardial infarction [facility]) 2012    Shoulder pain     Sigmoid volvulus ([facility] [address]     june 2022       Past Surgical History:   Procedure Laterality Date    ANESTHESIA NERVE BLOCK Bilateral 6/25/2019    BILATERAL SUPRASCAPULAR BLOCK UNDER ULTRASOUND  (CPT: 83067) performed by Chon Chairez MD at [facility] Bilateral 10/1/2019    BILATERAL [address] performed by Chon Chairez MD at [address] [place] [address]  june 2012    CARDIAC SURGERY  june 2012    triple bypass    CARPAL TUNNEL RELEASE      CARPAL TUNNEL RELEASE Right 04/25/2018    COLONOSCOPY N/A 6/22/2022    COLONOSCOPY FLEXIBLE W/ DECOMPRESSION performed by Chad Langley MD at [address]  2012    EYE SURGERY Bilateral     cataracts    NERVE BLOCK  11/24/14    bilateral shoulder injection #1    NERVE BLOCK Bilateral 12/1/2014    alexys shoulder injections  #2    NERVE BLOCK N/A 1/7/2015    cervical epidural  #1    NERVE BLOCK N/A 1 21 15    cerv ep #2    NERVE BLOCK Bilateral 01/23/2017    Bilateral shoulder injection #1    NERVE BLOCK Bilateral 06/25/2019    suprascapular block with sedation    NERVE BLOCK Bilateral 10/01/2019    suprascapular     OTHER SURGICAL HISTORY Bilateral 03/17/2020    bilateral suprascapular nerve pulsed RFA under ultrasound guidance    PAIN MANAGEMENT PROCEDURE Bilateral 3/17/2020    BILATERAL SUPRASCAPULAR NERVE RADIOFREQUENCY ABLATION WITH ULTRASOUND (CPT 51939 [id number]) SEDATION performed by Greyson Vazquez MD at [address] [facility] N/CARPAL TUNNEL SURG Right 4/25/2018    RIGHT HAND CARPAL TUNNEL RELEASE performed by Riya King MD at [address] [place] [address]       Current Facility-Administered Medications   Medication Dose Route Frequency Provider Last Rate Last Admin    [Held by provider] aspirin EC tablet 81 mg  81 mg Oral Daily Evelia Quick, DO        [Held by provider] atorvastatin (LIPITOR) tablet 20 mg  20 mg Oral Nightly Evelia Quick, DO        [Held by provider] furosemide (LASIX) tablet 20 mg  20 mg Oral BID Evelia Quick, DO        [Held by provider] HYDROcodone-acetaminophen (NORCO) 7.5-325 MG per tablet 1 tablet  1 tablet Oral Q6H PRN Evelia Quick, DO        metoprolol tartrate (LOPRESSOR) tablet 25 mg  25 mg Oral BID Evelia Quick, DO   25 mg at 12/17/22 1305    [Held by provider] lisinopril (PRINIVIL;ZESTRIL) tablet 10 mg  10 mg Oral BID Evelia Quick, DO        [Held by provider] naloxegol (MOVANTIK) tablet 25 mg  25 mg Oral QAM AC Evelia Quick, DO        hydrALAZINE (APRESOLINE) injection 5 mg  5 mg IntraVENous Q4H PRN Evelia Quick, DO        magnesium sulfate 1000 mg in dextrose 5% 100 mL IVPB  1,000 mg IntraVENous PRN Evelia Quick, DO        sodium phosphate 10.17 mmol in sodium chloride 0.9 % 250 mL IVPB  0.16 mmol/kg IntraVENous PRN Evelia Quick, DO        Or    sodium phosphate 20.31 mmol in sodium chloride 0.9 % 500 mL IVPB  0.32 mmol/kg IntraVENous PRN Evelia Quick, DO        potassium chloride (KLOR-CON M) extended release tablet 40 mEq  40 mEq Oral PRN Evelia Quick, DO        Or    potassium bicarb-citric acid (EFFER-K) effervescent tablet 40 mEq  40 mEq Oral PRN Evelia Quick, DO        Or    potassium chloride 10 mEq/100 mL IVPB (Peripheral Line)  10 mEq IntraVENous PRN Amaris Araceli, DO        pantoprazole (PROTONIX) injection 40 mg  40 mg IntraVENous Daily Amaris Rowea, DO   40 mg at 12/17/22 1305    metronidazole (FLAGYL) 500 mg in 0.9% NaCl 100 mL IVPB premix  500 mg IntraVENous Q8H Amaris Rowea, DO   Stopped at 12/17/22 1407    cefTRIAXone (ROCEPHIN) 1,000 mg in sterile water 10 mL IV syringe  1,000 mg IntraVENous Q24H Amaris Rowea, DO   1,000 mg at 12/17/22 1305    sodium chloride flush 0.9 % injection 5-40 mL  5-40 mL IntraVENous 2 times per day Amaris Rowea, DO   10 mL at 12/17/22 1309    sodium chloride flush 0.9 % injection 5-40 mL  5-40 mL IntraVENous PRN Amaris Rowea, DO        0.9 % sodium chloride infusion   IntraVENous PRN Amaris Rowea, DO        enoxaparin (LOVENOX) injection 40 mg  40 mg SubCUTAneous Daily Amaris Rowea, DO        ondansetron (ZOFRAN-ODT) disintegrating tablet 4 mg  4 mg Oral Q8H PRN Amaris Araceli, DO        Or    ond[facility]ron [facility] injection 4 mg  4 mg IntraVENous Q6H PRN Amaris Araceli, DO        polyethylene glycol (GLYCOLAX) packet 17 g  17 g Oral Daily PRN Amaris Rowea, DO        acetaminophen (TYLENOL) tablet 650 mg  650 mg Oral Q6H PRN Amaris Rowea, DO        Or    acetaminophen (TYLENOL) suppository 650 mg  650 mg Rectal Q6H PRN Amaris Pattersonlla, DO        glucose chewable tablet 16 g  4 tablet Oral PRN Clearence Garbe, APRN - CNP        dextrose bolus 10% 125 mL  125 mL IntraVENous PRN Clearence Garbe, APRN - CNP        Or    dextrose bolus 10% 250 mL  250 mL IntraVENous PRN Clearence Garbe, APRN - CNP        glucagon (rDNA) injection 1 mg  1 mg SubCUTAneous PRN Clearence Garbe, APRN - CNP        dextrose 10 % infusion   IntraVENous Continuous PRN Clearence Garbe, APRN - CNP        insulin lispro (HUMALOG) injection vial 0-4 Units  0-4 Units SubCUTAneous 6 times per day Clearence Garbe, APRN - CNP        0.9% NaCl with KCl 40 mEq infusion   IntraVENous Continuous MELQUIADES Mccray CNP 60 mL/hr at 12/17/22 1307 New Bag at 12/17/22 1307    mineral oil enema 1 enema  1 enema Rectal PRN Yuli Fairchild MD        bisacodyl (DULCOLAX) EC tablet 10 mg  10 mg Oral Q4H Yuli Fairchild MD        docusate sodium (COLACE) capsule 100 mg  100 mg Oral Daily Yuli Fairchild MD           Allergies   Allergen Reactions    Latex Rash     contact    Adhesive Tape     Glimepiride      Itching--9/2013    Tetanus Toxoids Swelling       The patient has a family history that is negative for severe cardiovascular or respiratory issues, negative for reaction to anesthesia. Social History     Socioeconomic History    Marital status:      Spouse name: Not on file    Number of children: Not on file    Years of education: Not on file    Highest education level: Not on file   Occupational History    Not on file   Tobacco Use    Smoking status: Never    Smokeless tobacco: Never   Vaping Use    Vaping Use: Never used   Substance and Sexual Activity    Alcohol use:  Yes     Alcohol/week: 7.0 standard drinks     Types: 7 Shots of liquor per week     Comment: \"a shot every evening\"    Drug use: No    Sexual activity: Not Currently     Partners: Male   Other Topics Concern    Not on file   Social History Narrative    Not on file     Social Determinants of Health     Financial Resource Strain: Not on file   Food Insecurity: Not on file   Transportation Needs: Not on file   Physical Activity: Not on file   Stress: Not on file   Social Connections: Not on file   Intimate Partner Violence: Not on file   Housing Stability: Not on file           Review of Systems  General ROS: negative for - chills, fatigue or fever  ENT ROS: negative for - headaches, hearing change or nasal congestion  Endocrine ROS: negative for - breast changes or galactorrhea, no polyuria  Respiratory ROS: negative for - hemoptysis, orthopnea or pleuritic pain  Cardiovascular ROS: negative for - dyspnea on exertion, edema or loss of consciousness  Gastrointestinal ROS: negative for - blood in stools, heartburn, hematemesis or melena  : no polyuria, no dysuria  Musculoskeletal ROS: negative for - gait disturbance  Dermatological ROS: negative for - acne, dry skin or eczema  Neuro ROS: no recent memory loss or mood swings. Physical exam:   BP (!) 151/72   Pulse (!) 113   Temp 99.1 °F (37.3 °C) (Oral)   Resp 18   Ht 4' 11\" (1.499 m)   Wt 140 lb (63.5 kg)   SpO2 96%   BMI 28.28 kg/m²   General appearance:  NAD  Head: NCAT, PERRLA, EOMI, red conjunctiva  Neck: supple, no masses  Lungs: CTAB, equal chest rise bilateral  Heart: Reg rate  Abdomen: soft, nontender, nondistended,  Skin; no lesions  Gu: no cva tenderness  Extremities: extremities normal, atraumatic, no cyanosis or edema      Radiology:  CT abdomen/pelvis:   1. 11.3 cm rectal stool impaction. Perirectal infiltration suggesting   proctitis. 2. Cholelithiasis. Distended gallbladder with moderate intrahepatic and   extrahepatic biliary ductal dilatation. No ampullary mass lesion is   identified on CT. Consider further evaluation by ERCP/MRCP as clinically   indicated. 3. Mild right basilar pulmonary infiltrate may represent aspiration or other   pneumonitis.        Assessment:  80 y.o. female with severe constipation and fecal impaction with proctitis    Plan:  CBC, CMP, monitor exam for peritonitis and will do mineral oil enemas and strong bowel regimen yanet Angulo MD  12/17/2022  1:57 PM

## 2022-12-17 NOTE — ED NOTES
Patient cleaned from arrival - incontinent of bowel, loose and soft brown stool. Linens changed and patient cleaned, tolerated well, unable to void at this time, patient requesting pain medicine at this time, has old fentanyl patch noted to mid upper back.  Per son she was due last night      Liudmila Angela RN  12/17/22 4435

## 2022-12-17 NOTE — ED NOTES
Lab redrawn and labeled at bedside, patient states she has had another incident with her bowels- while cleaning patient - patient had urine episode, unable to collect specimen.       Emilee Pfeiffer RN  12/17/22 0848

## 2022-12-17 NOTE — ED PROVIDER NOTES
Department of Emergency Medicine   ED Provider Note  Admit Date/RoomTime: 12/17/2022  2:32 AM  ED Room: 0313/0313-02          History of Present Illness:  12/17/22, Time: 2:48 AM NICCI Don is a 80 y.o. female with history of chronic shoulder pain presenting to the ED for constipation, beginning 4 days ago. The complaint has been persistent, moderate in severity, and worsened by opiate medications. Patient is on oral opiate medications as well as a fentanyl patch. She has had issues with constipation in the past several years ago. Per chart review patient was previously prescribed Movantik by GI, discussed with patient's son who is at bedside, she is not currently taking this. She has tried a suppository at home without relief. She has had loose stools and leakage of watery stools for the last couple days. No melena or hematochezia. Some mild crampy pain diffusely. She is ambulatory at home. No fevers or chills. Mild nausea, no vomiting. No abdominal distention. No dysuria or hematuria urgency or frequency. Additional history obtained from chart review, patient's son who is at bedside. Review of Systems:     Pertinent positives and negatives are stated within HPI. 10 point ROS otherwise negative.    --------------------------------------------- PAST HISTORY ---------------------------------------------  Past Medical History:  has a past medical history of Arthritis, CAD (coronary artery disease), Hyperlipidemia, Hypertension, Latex allergy, Myocardial infarction [facility]), Shoulder pain, and Sigmoid volvulus ([facility] [address] Past Surgical History:  has a past surgical history that includes Aortic valve replacement (june 2012); Nerve Block (11/24/14); Nerve Block (Bilateral, 12/1/2014); Nerve Block (N/A, 1/7/2015); Nerve Block (N/A, 1 21 15); Coronary artery bypass graft (2012); Nerve Block (Bilateral, 01/23/2017);  Carpal tunnel release; Carpal tunnel release (Right, 04/25/2018); pr revise median n/carpal tunnel surg (Right, 4/25/2018); Nerve Block (Bilateral, 06/25/2019); Anesthesia Nerve Block (Bilateral, 6/25/2019); Nerve Block (Bilateral, 10/01/2019); Anesthesia Nerve Block (Bilateral, 10/1/2019); other surgical history (Bilateral, 03/17/2020); Pain management procedure (Bilateral, 3/17/2020); Cardiac surgery (june 2012); eye surgery (Bilateral); and Colonoscopy (N/A, 6/22/2022). Social History:  reports that she has never smoked. She has never used smokeless tobacco. She reports current alcohol use of about 7.0 standard drinks per week. She reports that she does not use drugs. Family History: family history includes Arthritis in an other family member; Cancer in her mother; Diabetes in her mother, sister, and sister; Heart Failure in her father; Hypertension in her father, mother, and sister. The patients home medications have been reviewed. Allergies: Latex, Adhesive tape, Glimepiride, and Tetanus toxoids      ---------------------------------------------------PHYSICAL EXAM--------------------------------------    Constitutional/General: Awake and alert, NAD, no labored breathing, chronically ill-appearing, hard of hearing  Head: Normocephalic and atraumatic  Eyes: EOMI, conjunctiva normal, sclera non icteric  Ears: Normal external ears  Nose: Normal external nose  Mouth: Mildly dry mucous membranes, uvula midline  Neck: Supple, no stridor, no meningeal signs  Respiratory: Lungs clear to auscultation bilaterally, no wheezes, rales, or rhonchi. Not in respiratory distress  Cardiovascular:  Regular rate. Regular rhythm.  + systolic ejection murmur, no gallops, or rubs. 2+ distal pulses. Equal extremity pulses. Chest: No chest wall tenderness or deformity  GI:  Abdomen Soft, Non tender, Non distended. No rebound, guarding, or rigidity. No pulsatile masses. Musculoskeletal: Moves all extremities x 4. Warm and well perfused, no clubbing, cyanosis, or edema.  Capillary refill <3 seconds  Integument: skin warm and dry. Neurologic: GCS 15, no focal deficits, alert and responsive, symmetric strength 4/5 in the major muscle groups of upper and lower extremities bilaterally  Psychiatric: Normal Affect      -------------------------------------------------- RESULTS -------------------------------------------------  I have personally reviewed and interpreted all laboratory and imaging results for this patient. Results are listed below.      LABS:  Results for orders placed or performed during the hospital encounter of 12/17/22   CBC with Auto Differential   Result Value Ref Range    WBC 12.7 (H) 4.5 - 11.5 E9/L    RBC 3.82 3.50 - 5.50 E12/L    Hemoglobin 12.5 11.5 - 15.5 g/dL    Hematocrit 36.5 34.0 - 48.0 %    MCV 95.5 80.0 - 99.9 fL    MCH 32.7 26.0 - 35.0 pg    MCHC 34.2 32.0 - 34.5 %    RDW 13.3 11.5 - 15.0 fL    Platelets 658 519 - 015 E9/L    MPV 11.2 7.0 - 12.0 fL    Neutrophils % 93.9 (H) 43.0 - 80.0 %    Lymphocytes % 2.6 (L) 20.0 - 42.0 %    Monocytes % 3.5 2.0 - 12.0 %    Eosinophils % 0.1 0.0 - 6.0 %    Basophils % 0.2 0.0 - 2.0 %    Neutrophils Absolute 11.94 (H) 1.80 - 7.30 E9/L    Lymphocytes Absolute 0.38 (L) 1.50 - 4.00 E9/L    Monocytes Absolute 0.51 0.10 - 0.95 E9/L    Eosinophils Absolute 0.00 (L) 0.05 - 0.50 E9/L    Basophils Absolute 0.00 0.00 - 0.20 E9/L    Anisocytosis 1+     Polychromasia 1+     Poikilocytes 1+     Lenora Cells 1+     Ovalocytes 1+     Tear Drop Cells 1+    Lipase   Result Value Ref Range    Lipase 16 13 - 60 U/L   Lactic Acid   Result Value Ref Range    Lactic Acid 1.9 0.5 - 2.2 mmol/L   Urinalysis with Microscopic   Result Value Ref Range    Color, UA Yellow Straw/Yellow    Clarity, UA SLCLOUDY Clear    Glucose, Ur 100 (A) Negative mg/dL    Bilirubin Urine Negative Negative    Ketones, Urine 40 (A) Negative mg/dL    Specific Gravity, UA 1.015 1.005 - 1.030    Blood, Urine TRACE (A) Negative    pH, UA 8.0 5.0 - 9.0    Protein, UA Negative Negative mg/dL encounter and vital signs as below have been reviewed by myself. /64   Pulse 100   Temp 99.6 °F (37.6 °C) (Oral)   Resp 18   Ht 4' 11\" (1.499 m)   Wt 140 lb (63.5 kg)   SpO2 99%   BMI 28.28 kg/m²   Oxygen Saturation Interpretation: Normal    The patients available past medical records and past encounters were reviewed, see MDM for details.         ------------------------------ ED COURSE/MEDICAL DECISION MAKING----------------------  Medications   aspirin EC tablet 81 mg ( Oral Automatically Held 12/20/22 0900)   atorvastatin (LIPITOR) tablet 20 mg ( Oral Automatically Held 12/20/22 2100)   furosemide (LASIX) tablet 20 mg ( Oral Automatically Held 12/20/22 2100)   HYDROcodone-acetaminophen ([facility]) 7.5-325 MG per tablet 1 tablet ( Oral Held by provider 12/17/22 0934)   metoprolol tartrate (LOPRESSOR) tablet 25 mg (25 mg Oral Given 12/17/22 1305)   lisinopril (PRINIVIL;ZESTRIL) tablet 10 mg ( Oral Automatically Held 12/20/22 2100)   naloxegol (MOVANTIK) tablet 25 mg ( Oral Automatically Held 12/21/22 0700)   hydrALAZINE (APRESOLINE) injection 5 mg (has no administration in time range)   magnesium sulfate 1000 mg in dextrose 5% 100 mL IVPB (has no administration in time range)   sodium phosphate 10.17 mmol in sodium chloride 0.9 % 250 mL IVPB (has no administration in time range)     Or   sodium phosphate 20.31 mmol in sodium chloride 0.9 % 500 mL IVPB (has no administration in time range)   potassium chloride (KLOR-CON M) extended release tablet 40 mEq (has no administration in time range)     Or   potassium bicarb-citric acid (EFFER-K) effervescent tablet 40 mEq (has no administration in time range)     Or   potassium chloride 10 mEq/100 mL IVPB (Peripheral Line) (has no administration in time range)   pantoprazole (PROTONIX) injection 40 mg (40 mg IntraVENous Given 12/17/22 1305)   metronidazole (FLAGYL) 500 mg in 0.9% NaCl 100 mL IVPB premix ( IntraVENous Canceled Entry 12/17/22 2743)   cefTRIAXone (ROCEPHIN) 1,000 mg in sterile water 10 mL IV syringe (1,000 mg IntraVENous Given 12/17/22 1305)   sodium chloride flush 0.9 % injection 5-40 mL (10 mLs IntraVENous Given 12/17/22 1309)   sodium chloride flush 0.9 % injection 5-40 mL (has no administration in time range)   0.9 % sodium chloride infusion (has no administration in time range)   enoxaparin (LOVENOX) injection 40 mg (40 mg SubCUTAneous Not Given 12/17/22 1248)   ondansetron (ZOFRAN-ODT) disintegrating tablet 4 mg (has no administration in time range)     Or   ondansetron (ZOFRAN) injection 4 mg (has no administration in time range)   polyethylene glycol (GLYCOLAX) packet 17 g (has no administration in time range)   acetaminophen (TYLENOL) tablet 650 mg (has no administration in time range)     Or   acetaminophen (TYLENOL) suppository 650 mg (has no administration in time range)   glucose chewable tablet 16 g (has no administration in time range)   dextrose bolus 10% 125 mL (has no administration in time range)     Or   dextrose bolus 10% 250 mL (has no administration in time range)   glucagon (rDNA) injection 1 mg (has no administration in time range)   dextrose 10 % infusion (has no administration in time range)   insulin lispro (HUMALOG) injection vial 0-4 Units (0 Units SubCUTAneous Not Given 12/17/22 1600)   0.9% NaCl with KCl 40 mEq infusion ( IntraVENous New Bag 12/17/22 1307)   mineral oil enema 1 enema (has no administration in time range)   bisacodyl (DULCOLAX) EC tablet 10 mg (10 mg Oral Given 12/17/22 1839)   docusate sodium (COLACE) capsule 100 mg (100 mg Oral Given 12/17/22 1838)   iopamidol (ISOVUE-370) 76 % injection 75 mL (75 mLs IntraVENous Given 12/17/22 0603)            Medical Decision Making:     ED Course as of 12/17/22 2000   Sat Dec 17, 2022   0711 Pt signed out to me by Dr. Malrin Pan. Spoke with pt regarding plan to admit for further management and work-up.  She is understanding and agreeable.  [CP]   [id number] with Dr. Jacquelyn Moser who did agree to admit pt to medicine.  [CP]      ED Course User Index  [CP] Gatito Reese DO       Is an 51-year-old female presents emerged department for constipation. Patient previously  Take as she is on chronic opiate therapy, she has not been taking this, unclear how long. Patient previously had issues with constipation and fecal impaction, actually had to have endoscopic decompression. Patient here normotensive and afebrile, borderline tachycardic, nontoxic-appearing with benign abdominal exam, but with some tenderness in the lower abdomen. Lab work with mild leukocytosis, hemoglobin at baseline, otherwise unremarkable and reassuring. CT imaging with dilated intrahepatic extrahepatic bile ducts, distended gallbladder with no wall thickening or pericholecystic fluid, but with gallstones. Imaging also shows evidence of fecal impaction with wall thickening of the rectum consistent with stercoral proctitis. Given dilated bile ducts, extent of patient's fecal impaction and wall thickening of the rectum, patient will be admitted to the hospital for further work-up, treatment, monitoring. See ED COURSE for additional MDM. Labs & imaging reviewed and interpreted, see RESULTS above. Re-Evaluations:           See ED Course above. This patient's ED course included: a personal history and physicial examination, re-evaluation prior to disposition, and multiple bedside re-evaluations    This patient has remained hemodynamically stable during their ED course. Consultations:  See ED Course    Counseling: The emergency physician has spoken with the patient and discussed todays results, in addition to providing specific details for the plan of care and counseling regarding the diagnosis and prognosis. Questions are answered at this time and they are agreeable with the plan.       --------------------------------- IMPRESSION AND DISPOSITION ---------------------------------    IMPRESSION  1.  Fecal

## 2022-12-17 NOTE — DISCHARGE INSTRUCTIONS
Your information:  Name: Ashley Cheney  : 1935    Your instructions:  Discharge home    Start taking MiraLAX 1 capful in 8 ounces of liquid twice a day until you are having loose stools, then cut back to once a day. Also try stool softener such as Dulcolax, can take this twice a day along with the MiraLAX. Also start taking Movantic and follow up with GI. Signs and symptoms to watch out for:  Call your doctor now or seek immediate medical care if:    You have new or worse belly pain. You have new or worse nausea or vomiting. You have blood in your stools. Watch closely for changes in your health, and be sure to contact your doctor if:    Your constipation is getting worse. You do not get better as expected. What to do after you leave the hospital:    Recommended diet: clear liquids, advance as tolerated    Recommended activity: activity as tolerated        The following personal items were collected during your admission and were returned to you:    Belongings  Dental Appliances: None  Vision - Corrective Lenses: None  Hearing Aid: None  Clothing: Jacket/Coat, Footwear, Pants, Shirt, Socks, Undergarments  Jewelry: Watch, Bracelet  Body Piercings Removed: N/A  Electronic Devices: None  Weapons (Notify Protective Services/Security): None  Other Valuables: At home  Home Medications: None  Valuables Given To: Patient  Provide Name(s) of Who Valuable(s) Were Given To: pt  Responsible person(s) in the waiting room: son  Patient approves for provider to speak to responsible person post operatively: Yes    Information obtained by:  By signing below, I understand that if any problems occur once I leave the hospital I am to contact Dr. Saray Crawford. I understand and acknowledge receipt of the instructions indicated above.

## 2022-12-17 NOTE — ED NOTES
Attempted SSE, only accepted 400 cc of warm fluid before patient asked to stop, unable to hold any in, patient pushes as this RN tries to put in the fluid. Large amount of fluid/stool out of rectum - no stool. Continues to state pain in rectum. Dr. Dannielle Vera aware.       Gage Locke RN  12/17/22 7427

## 2022-12-17 NOTE — ED NOTES
Patient had small bowel incontinence, loose brown , patient cleaned and linens changed at this time. Unable to void at this time.  Right groin redness     Slava Doty, RN  12/17/22 1301

## 2022-12-17 NOTE — CARE COORDINATION
Ss note: 12/17/20229:42 AM Pt in ED room 10, to be admitted. Consult noted for discharge planning. Therapy has been ordered, will await eval and more information on chart and will follow up with pt for transition of care needs.  SAKINA Cates

## 2022-12-18 ENCOUNTER — APPOINTMENT (OUTPATIENT)
Dept: GENERAL RADIOLOGY | Age: 87
DRG: 872 | End: 2022-12-18
Payer: MEDICARE

## 2022-12-18 LAB
ALBUMIN SERPL-MCNC: 3.3 G/DL (ref 3.5–5.2)
ALP BLD-CCNC: 71 U/L (ref 35–104)
ALT SERPL-CCNC: 9 U/L (ref 0–32)
ANION GAP SERPL CALCULATED.3IONS-SCNC: 13 MMOL/L (ref 7–16)
AST SERPL-CCNC: 40 U/L (ref 0–31)
BASOPHILS ABSOLUTE: 0.02 E9/L (ref 0–0.2)
BASOPHILS RELATIVE PERCENT: 0.1 % (ref 0–2)
BILIRUB SERPL-MCNC: 1.2 MG/DL (ref 0–1.2)
BILIRUBIN DIRECT: <0.2 MG/DL (ref 0–0.3)
BILIRUBIN, INDIRECT: ABNORMAL MG/DL (ref 0–1)
BUN BLDV-MCNC: 11 MG/DL (ref 6–23)
CALCIUM SERPL-MCNC: 8.5 MG/DL (ref 8.6–10.2)
CHLORIDE BLD-SCNC: 104 MMOL/L (ref 98–107)
CHOLESTEROL, TOTAL: 193 MG/DL (ref 0–199)
CO2: 21 MMOL/L (ref 22–29)
CREAT SERPL-MCNC: 0.4 MG/DL (ref 0.5–1)
EOSINOPHILS ABSOLUTE: 0 E9/L (ref 0.05–0.5)
EOSINOPHILS RELATIVE PERCENT: 0 % (ref 0–6)
GFR SERPL CREATININE-BSD FRML MDRD: >60 ML/MIN/1.73
GLUCOSE BLD-MCNC: 125 MG/DL (ref 74–99)
HBA1C MFR BLD: 4.8 % (ref 4–5.6)
HCT VFR BLD CALC: 34.3 % (ref 34–48)
HDLC SERPL-MCNC: 57 MG/DL
HEMOGLOBIN: 11.7 G/DL (ref 11.5–15.5)
IMMATURE GRANULOCYTES #: 0.04 E9/L
IMMATURE GRANULOCYTES %: 0.3 % (ref 0–5)
LDL CHOLESTEROL CALCULATED: 117 MG/DL (ref 0–99)
LYMPHOCYTES ABSOLUTE: 1.2 E9/L (ref 1.5–4)
LYMPHOCYTES RELATIVE PERCENT: 8.1 % (ref 20–42)
MAGNESIUM: 2.1 MG/DL (ref 1.6–2.6)
MCH RBC QN AUTO: 33.1 PG (ref 26–35)
MCHC RBC AUTO-ENTMCNC: 34.1 % (ref 32–34.5)
MCV RBC AUTO: 96.9 FL (ref 80–99.9)
METER GLUCOSE: 114 MG/DL (ref 74–99)
METER GLUCOSE: 153 MG/DL (ref 74–99)
MONOCYTES ABSOLUTE: 1 E9/L (ref 0.1–0.95)
MONOCYTES RELATIVE PERCENT: 6.7 % (ref 2–12)
NEUTROPHILS ABSOLUTE: 12.58 E9/L (ref 1.8–7.3)
NEUTROPHILS RELATIVE PERCENT: 84.8 % (ref 43–80)
PDW BLD-RTO: 13.4 FL (ref 11.5–15)
PHOSPHORUS: 2.8 MG/DL (ref 2.5–4.5)
PLATELET # BLD: 166 E9/L (ref 130–450)
PMV BLD AUTO: 10.4 FL (ref 7–12)
POTASSIUM SERPL-SCNC: 3.5 MMOL/L (ref 3.5–5)
PRO-BNP: ABNORMAL PG/ML (ref 0–450)
RBC # BLD: 3.54 E12/L (ref 3.5–5.5)
SODIUM BLD-SCNC: 138 MMOL/L (ref 132–146)
T4 FREE: 0.93 NG/DL (ref 0.93–1.7)
TOTAL PROTEIN: 5.7 G/DL (ref 6.4–8.3)
TRIGL SERPL-MCNC: 94 MG/DL (ref 0–149)
TSH SERPL DL<=0.05 MIU/L-ACNC: 0.24 UIU/ML (ref 0.27–4.2)
VLDLC SERPL CALC-MCNC: 19 MG/DL
WBC # BLD: 14.8 E9/L (ref 4.5–11.5)

## 2022-12-18 PROCEDURE — 97161 PT EVAL LOW COMPLEX 20 MIN: CPT | Performed by: PHYSICAL THERAPIST

## 2022-12-18 PROCEDURE — 84443 ASSAY THYROID STIM HORMONE: CPT

## 2022-12-18 PROCEDURE — 99232 SBSQ HOSP IP/OBS MODERATE 35: CPT | Performed by: SURGERY

## 2022-12-18 PROCEDURE — 6360000002 HC RX W HCPCS: Performed by: INTERNAL MEDICINE

## 2022-12-18 PROCEDURE — 83735 ASSAY OF MAGNESIUM: CPT

## 2022-12-18 PROCEDURE — 71045 X-RAY EXAM CHEST 1 VIEW: CPT

## 2022-12-18 PROCEDURE — 80048 BASIC METABOLIC PNL TOTAL CA: CPT

## 2022-12-18 PROCEDURE — 85025 COMPLETE CBC W/AUTO DIFF WBC: CPT

## 2022-12-18 PROCEDURE — 83036 HEMOGLOBIN GLYCOSYLATED A1C: CPT

## 2022-12-18 PROCEDURE — 80076 HEPATIC FUNCTION PANEL: CPT

## 2022-12-18 PROCEDURE — 84439 ASSAY OF FREE THYROXINE: CPT

## 2022-12-18 PROCEDURE — 97530 THERAPEUTIC ACTIVITIES: CPT | Performed by: PHYSICAL THERAPIST

## 2022-12-18 PROCEDURE — 80061 LIPID PANEL: CPT

## 2022-12-18 PROCEDURE — 84100 ASSAY OF PHOSPHORUS: CPT

## 2022-12-18 PROCEDURE — 2500000003 HC RX 250 WO HCPCS: Performed by: INTERNAL MEDICINE

## 2022-12-18 PROCEDURE — 6370000000 HC RX 637 (ALT 250 FOR IP): Performed by: INTERNAL MEDICINE

## 2022-12-18 PROCEDURE — 97116 GAIT TRAINING THERAPY: CPT | Performed by: PHYSICAL THERAPIST

## 2022-12-18 PROCEDURE — 2580000003 HC RX 258: Performed by: INTERNAL MEDICINE

## 2022-12-18 PROCEDURE — 1200000000 HC SEMI PRIVATE

## 2022-12-18 PROCEDURE — 82962 GLUCOSE BLOOD TEST: CPT

## 2022-12-18 PROCEDURE — 83880 ASSAY OF NATRIURETIC PEPTIDE: CPT

## 2022-12-18 PROCEDURE — 36415 COLL VENOUS BLD VENIPUNCTURE: CPT

## 2022-12-18 PROCEDURE — C9113 INJ PANTOPRAZOLE SODIUM, VIA: HCPCS | Performed by: INTERNAL MEDICINE

## 2022-12-18 RX ORDER — FUROSEMIDE 20 MG/1
20 TABLET ORAL 2 TIMES DAILY
Status: DISCONTINUED | OUTPATIENT
Start: 2022-12-18 | End: 2022-12-20 | Stop reason: HOSPADM

## 2022-12-18 RX ORDER — FUROSEMIDE 10 MG/ML
40 INJECTION INTRAMUSCULAR; INTRAVENOUS ONCE
Status: COMPLETED | OUTPATIENT
Start: 2022-12-18 | End: 2022-12-18

## 2022-12-18 RX ORDER — DOCUSATE SODIUM 100 MG/1
100 CAPSULE, LIQUID FILLED ORAL 2 TIMES DAILY
Status: DISCONTINUED | OUTPATIENT
Start: 2022-12-18 | End: 2022-12-20 | Stop reason: HOSPADM

## 2022-12-18 RX ADMIN — DOCUSATE SODIUM 100 MG: 100 CAPSULE, LIQUID FILLED ORAL at 20:28

## 2022-12-18 RX ADMIN — METOPROLOL TARTRATE 25 MG: 25 TABLET, FILM COATED ORAL at 07:54

## 2022-12-18 RX ADMIN — PANTOPRAZOLE SODIUM 40 MG: 40 INJECTION, POWDER, FOR SOLUTION INTRAVENOUS at 12:29

## 2022-12-18 RX ADMIN — DOCUSATE SODIUM 100 MG: 100 CAPSULE, LIQUID FILLED ORAL at 07:54

## 2022-12-18 RX ADMIN — FUROSEMIDE 40 MG: 10 INJECTION, SOLUTION INTRAMUSCULAR; INTRAVENOUS at 07:03

## 2022-12-18 RX ADMIN — ENOXAPARIN SODIUM 40 MG: 100 INJECTION SUBCUTANEOUS at 07:56

## 2022-12-18 RX ADMIN — METOPROLOL TARTRATE 25 MG: 25 TABLET, FILM COATED ORAL at 20:28

## 2022-12-18 RX ADMIN — ASPIRIN 81 MG: 81 TABLET, COATED ORAL at 07:54

## 2022-12-18 RX ADMIN — METRONIDAZOLE 500 MG: 500 INJECTION, SOLUTION INTRAVENOUS at 12:50

## 2022-12-18 RX ADMIN — METRONIDAZOLE 500 MG: 500 INJECTION, SOLUTION INTRAVENOUS at 20:29

## 2022-12-18 RX ADMIN — METRONIDAZOLE 500 MG: 500 INJECTION, SOLUTION INTRAVENOUS at 05:14

## 2022-12-18 RX ADMIN — ATORVASTATIN CALCIUM 20 MG: 20 TABLET, FILM COATED ORAL at 20:28

## 2022-12-18 RX ADMIN — CEFTRIAXONE 1000 MG: 1 INJECTION, POWDER, FOR SOLUTION INTRAMUSCULAR; INTRAVENOUS at 12:27

## 2022-12-18 RX ADMIN — FUROSEMIDE 20 MG: 20 TABLET ORAL at 17:57

## 2022-12-18 RX ADMIN — Medication 10 ML: at 21:36

## 2022-12-18 NOTE — PROGRESS NOTES
PROGRESS NOTE    By Carina Santamaria D.O. [facility]  Dr. Jenni Goode MD, Dr. Eual Sicard, MD, Dr Nanette Shea, Dr. Faith Gutierrez MD, Dr. Carina Santamaria, [facility]  80 y.o.  female    SUBJECTIVE: Patient seen and examined in bed with family at bedside. Multiple BMs yesterday. Tolerating diet.      OBJECTIVE:    /64   Pulse 74   Temp 97.6 °F (36.4 °C) (Oral)   Resp 24   Ht 4' 11\" (1.499 m)   Wt 140 lb (63.5 kg)   SpO2 94%   BMI 28.28 kg/m²     Gen: NAD, AAO x 3  HEENT:PEERL, no icterus  Heart: RRR, no M/R/G  Lungs: CTAB  Abd.: soft, NT, ND, BS +, no G/R, no HSM  Extr.: no C/C/E, no bruising         Lab Results   Component Value Date/Time    WBC 14.8 12/18/2022 03:08 AM    WBC 12.7 12/17/2022 03:20 AM    WBC 9.0 06/23/2022 05:50 AM    HGB 11.7 12/18/2022 03:08 AM    HGB 12.5 12/17/2022 03:20 AM    HGB 11.3 06/23/2022 05:50 AM    HCT 34.3 12/18/2022 03:08 AM    MCV 96.9 12/18/2022 03:08 AM    RDW 13.4 12/18/2022 03:08 AM     12/18/2022 03:08 AM     12/17/2022 03:20 AM     06/23/2022 05:50 AM     Lab Results   Component Value Date/Time     12/18/2022 03:08 AM    K 3.5 12/18/2022 03:08 AM    K 4.1 12/17/2022 04:50 AM     12/18/2022 03:08 AM    CO2 21 12/18/2022 03:08 AM    BUN 11 12/18/2022 03:08 AM    CREATININE 0.4 12/18/2022 03:08 AM    CALCIUM 8.5 12/18/2022 03:08 AM    PROT 5.7 12/18/2022 03:08 AM    LABALBU 3.3 12/18/2022 03:08 AM    BILITOT 1.2 12/18/2022 03:08 AM    BILITOT 0.8 12/17/2022 04:50 AM    BILITOT 1.3 06/23/2022 05:50 AM    ALKPHOS 71 12/18/2022 03:08 AM    ALKPHOS 80 12/17/2022 04:50 AM    ALKPHOS 77 06/23/2022 05:50 AM    AST 40 12/18/2022 03:08 AM    AST 21 12/17/2022 04:50 AM    AST 20 06/23/2022 05:50 AM    ALT 9 12/18/2022 03:08 AM    ALT 8 12/17/2022 04:50 AM    ALT 8 06/23/2022 05:50 AM     Lab Results   Component Value Date/Time    LIPASE 16 12/17/2022 03:20 AM    LIPASE 19 06/22/2022 05:30 AM     No results found for: AMYLASE      ASSESSMENT/PLAN:  1. Opiate Induced Constipation / Fecal Impaction - improving   - Previous history of sigmoid volvulus requiring decompressive colonoscopy. - CT imaging noted  - Question of medication compliance at home  - Appreciate surgery recommendations  - Continue Movantik, Miralax, Colace  - Mineral oil enema ordered per surgery  - Consider adding Amitiza 24 mcg pending clinical course  - Ok to advance diet as tolerated      2. Acute UTI / Sepsis  - Per admitting     Patient reports improved bowel movements. Continue bowel regimen once discharged. GI will follow PRN while in hospital. Please call with any questions.      Etienne Mora DO  12/18/2022  10:36 AM

## 2022-12-18 NOTE — PROGRESS NOTES
Surgery Progress Note            Chief complaint:   Chief Complaint   Patient presents with    Constipation     From home; c/o constipation with bowel leakage x a few days; alert to person, situation, place; reports taking norco around 2am to manage chronic shoulder pain; fentanyl patch on back currently       Patient Active Problem List   Diagnosis    Osteoarthritis of both shoulders    DDD (degenerative disc disease), cervical    Cervical facet syndrome    Somatic dysfunction bilateral shoulders    Subarachnoid hemorrhage (HCC)    Type 2 diabetes mellitus ([address]    Periorbital ecchymosis of right eye    Chronic pain of both shoulders    DDD (degenerative disc disease), lumbar    Lumbosacral spondylosis without myelopathy    Spinal stenosis of lumbar region    Pain of both shoulder joints    Cervicalgia    Chronic, continuous use of opioids    Neuralgia and neuritis    Cecal volvulus ([address]    Anemia    Hyperlipidemia with target low density lipoprotein (LDL) cholesterol less than 70 mg/dL    Ischemic heart disease    Fecal impaction ([address]       S: had several bms and feels better    O:   Vitals:    12/18/22 0900   BP:    Pulse:    Resp:    Temp:    SpO2: 94%       Intake/Output Summary (Last 24 hours) at 12/18/2022 0957  Last data filed at 12/17/2022 2024  Gross per 24 hour   Intake 60 ml   Output --   Net 60 ml           Labs:  Lab Results   Component Value Date/Time    WBC 14.8 12/18/2022 03:08 AM    WBC 12.7 12/17/2022 03:20 AM    WBC 9.0 06/23/2022 05:50 AM    HGB 11.7 12/18/2022 03:08 AM    HGB 12.5 12/17/2022 03:20 AM    HGB 11.3 06/23/2022 05:50 AM    HCT 34.3 12/18/2022 03:08 AM    HCT 36.5 12/17/2022 03:20 AM    HCT 34.0 06/23/2022 05:50 AM     Lab Results   Component Value Date    CREATININE 0.4 (L) 12/18/2022    BUN 11 12/18/2022     12/18/2022    K 3.5 12/18/2022     12/18/2022    CO2 21 (L) 12/18/2022     Lab Results   Component Value Date/Time    LIPASE 16 12/17/2022 03:20 AM    LIPASE 19 06/22/2022 05:30 AM         Physical exam:   /64   Pulse 74   Temp 97.6 °F (36.4 °C) (Oral)   Resp 24   Ht 4' 11\" (1.499 m)   Wt 140 lb (63.5 kg)   SpO2 94%   BMI 28.28 kg/m²   General appearance: NAD  Head: NCAT  Neck: supple, no masses  Lungs: equal chest rise bilateral  Heart: S1S2 present  Abdomen: soft, nontender, nondistended  Skin; no lesions  Gu: no cva tenderness  Extremities: extremities normal, atraumatic, no cyanosis or edema    A:  fecal impaction and constipation, resolving    P: ok for diet as tolerated, will follow prn.     Hina Olivia MD, MD  12/18/2022

## 2022-12-18 NOTE — PROGRESS NOTES
Physical Therapy    Physical Therapy Initial Evaluation/Plan of Care    Room #:  9302/4354-71  Patient Name: Guanakito Fong  YOB: 1935  MRN: [id number]    Date of Service: 12/18/2022     Tentative placement recommendation: Home Health Physical Therapy with 24/7 assist  Equipment recommendation: None      Evaluating Physical Therapist: Ryder Cheng, PT  #[id number]      Specific Provider Orders/Date/Referring Provider :  12/17/22 0945    PT eval and treat  Start:  12/17/22 0945,   End:  12/17/22 0945,   ONE TIME,   Standing Count:  1 Occurrences,   R         Shannon Morillo,      Admitting Diagnosis:   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]  Drug-induced constipation [K59.03]  Calculus of gallbladder without cholecystitis without obstruction [K80.20]  Fecal impaction in rectum (HCC) [K56.41]  Intrahepatic bile duct dilation [K83.8]  Fecal impaction ([address] [K56.41]    Admitted with    above which is resolving per surgeon note  Surgery: none  Visit Diagnoses         Codes    Fecal impaction in rectum ([address]    -  Primary K56.41    Drug-induced constipation     K59.03    Calculus of gallbladder without cholecystitis without obstruction     K80.20    Coloproctitis     K52.9    Intrahepatic bile duct dilation     K83.8            Patient Active Problem List   Diagnosis    Osteoarthritis of both shoulders    DDD (degenerative disc disease), cervical    Cervical facet syndrome    Somatic dysfunction bilateral shoulders    Subarachnoid hemorrhage (HCC)    Type 2 diabetes mellitus ([address]    Periorbital ecchymosis of right eye    Chronic pain of both shoulders    DDD (degenerative disc disease), lumbar    Lumbosacral spondylosis without myelopathy    Spinal stenosis of lumbar region    Pain of both shoulder joints    Cervicalgia    Chronic, continuous use of opioids    Neuralgia and neuritis    Cecal volvulus ([address]    Anemia    Hyperlipidemia with target low density lipoprotein (LDL) cholesterol less than 70 mg/dL    Ischemic heart disease    Fecal impaction ([facility]        ASSESSMENT of Current Deficits Patient exhibits decreased strength, balance, and endurance impairing functional mobility, transfers, gait , gait distance, and tolerance to activity are barriers to d/c and require skilled intervention to address concerns listed above to increase safety and independence at discharge. Decreased strength, balance and endurance  increases patient's risk for fall. Patient is  unsteady with gait during session with  loss of balance x 3 without support of walker and no , dizziness, or shortness of breath   . Using wheeled walker more independent with gait however needs supervision for safety      PHYSICAL THERAPY  PLAN OF CARE       Physical therapy plan of care is established based on physician order,  patient diagnosis and clinical assessment    Current Treatment Recommendations:    -Bed Mobility: Lower extremity exercises   -Standing Balance: Perform strengthening exercises in standing to promote motor control with or without upper extremity support   -Transfers: Provide instruction on proper hand and foot position for adequate transfer of weight onto lower extremities and use of gait device if needed and Cues for hand placement, technique and safety. Provide stabilization to prevent fall   -Gait: Gait training and Standing activities to improve: base of support, weight shift, weight bearing    -Endurance: Utilize Supervised activities to increase level of endurance to allow for safe functional mobility including transfers and gait   -Stairs: Stair training with instruction on proper technique and hand placement on rail    PT long term treatment goals are located in below grid    Patient and or family understand(s) diagnosis, prognosis, and plan of care. Frequency of treatments: Patient will be seen  daily.          Prior Level of Function: Patient ambulated independently    Rehab Potential: good   for baseline    Past medical history:   Past Medical History:   Diagnosis Date    Arthritis     CAD (coronary artery disease)     2012    Hyperlipidemia     Hypertension     Latex allergy     Myocardial infarction [facility]) 2012    Shoulder pain     Sigmoid volvulus ([address]     june 2022     Past Surgical History:   Procedure Laterality Date    ANESTHESIA NERVE BLOCK Bilateral 6/25/2019    BILATERAL SUPRASCAPULAR BLOCK UNDER ULTRASOUND  (CPT: 53993) performed by Larissa Riley MD at [facility] Bilateral 10/1/2019    BILATERAL [address] performed by Larissa Riley MD at [address] [facility]  june 2012    CARDIAC SURGERY  june 2012    triple bypass    CARPAL TUNNEL RELEASE      CARPAL TUNNEL RELEASE Right 04/25/2018    COLONOSCOPY N/A 6/22/2022    COLONOSCOPY FLEXIBLE W/ DECOMPRESSION [facility]  2012    EYE SURGERY Bilateral     cataracts    NERVE BLOCK  11/24/14    bilateral shoulder injection #1    NERVE BLOCK Bilateral 12/1/2014    alexys shoulder injections  #2    NERVE BLOCK N/A 1/7/2015    cervical epidural  #1    NERVE BLOCK N/A 1 21 15    cerv ep #2    NERVE BLOCK Bilateral 01/23/2017    Bilateral shoulder injection #1    NERVE BLOCK Bilateral 06/25/2019    suprascapular block with sedation    NERVE BLOCK Bilateral 10/01/2019    suprascapular     OTHER SURGICAL HISTORY Bilateral 03/17/2020    bilateral suprascapular nerve pulsed RFA under ultrasound guidance    PAIN MANAGEMENT PROCEDURE Bilateral 3/17/2020    BILATERAL SUPRASCAPULAR NERVE RADIOFREQUENCY ABLATION WITH ULTRASOUND (CPT 32101 [id number]) SEDATION performed by Larissa Riley MD at [address] [facility] N/CARPAL TUNNEL SURG Right 4/25/2018    RIGHT HAND CARPAL TUNNEL RELEASE performed by Pamela Garcia MD at [address]:    Precautions:  Up with assistance and incentive spirometer, falls and alarm , 2-3 times/week Liters of o2 via nasal cannula     Social history: Patient lives with son in a ranch home  with 1 step, with rail  to enter       Equipment owned: Wheeled Walker and Crutches,       [facility]   How much difficulty turning over in bed?: A Little  How much difficulty sitting down on / standing up from a chair with arms?: A Lot  How much difficulty moving from lying on back to sitting on side of bed?: A Little  How much help from another person moving to and from a bed to a chair?: A Lot  How much help from another person needed to walk in hospital room?: A Lot  How much help from another person for climbing 3-5 steps with a railing?: A Lot  AM-PAC Inpatient Mobility Raw Score : 14  AM-PAC Inpatient T-Scale Score : 38.1  Mobility Inpatient CMS 0-100% Score: 61.29  Mobility Inpatient CMS G-Code Modifier : CL    Nursing cleared patient for PT evaluation. The admitting diagnosis and active problem list as listed above have been reviewed prior to the initiation of this evaluation. OBJECTIVE;   Initial Evaluation  Date: 12/18/2022 Treatment Date:     Short Term/ Long Term   Goals   Was pt agreeable to Eval/treatment? Yes  To be met in 3 days   Pain level   5/10  bilateral shoulders     Bed Mobility    Rolling: Supervision     Supine to sit: Minimal assist of 1    Sit to supine: Minimal assist of 1    Scooting: Minimal assist of 1    Rolling: Independent    Supine to sit: Independent    Sit to supine: Independent    Scooting: Independent     Transfers Sit to stand:  Moderate assist of 1 x 2, min a x 2 reps from bed and commode  Sit to stand: Independent     Ambulation     2x50 feet using  hand held assist with Moderate assist of 1   and 100 feet with wheeled walker min a  for walker approximation, upright, and safety pt with lean to right    100 feet using  least restrictive device versus no device with Independent    ROM Within functional limits Increase range of motion 10% of affected joints    Strength BUE:   3-/5  RLE:  3/5  LLE:  3/5  Increase strength in affected mm groups by 1/3 grade   Balance Sitting EOB:  fair    Dynamic Standing:  poor initially however end of session fair minus  Sitting EOB:  good    Dynamic Standing: fair plus with with wheeled walker      Patient is Alert & Oriented x person and place and follows one step directions    Sensation:  Patient  denies numbness/tingling   Edema:  no   Endurance: poor fatigues easily       Patient education  Patient educated on role of Physical Therapy, risks of immobility, safety and plan of care, importance of positional changes for oxygen exchange,  importance of mobility while in hospital , importance and purpose of adaptive device and adjusted to proper height for the patient. , and safety      Patient response to education:   Pt verbalized understanding Pt demonstrated skill Pt requires further education in this area   Yes Partial Yes    Family present and aware of recommendations and pat's level of care  Treatment:  Patient practiced and was instructed/facilitated in the following treatment: Patient   Sat edge of bed 10 minutes with Minimal progressing to supervision to increase dynamic sitting balance and activity tolerance. seated and standing challenges      Therapeutic Exercises:  ankle pumps  x 10 reps. At end of session, patient in chair with family/friend present call light and phone within reach,  all lines and tubes intact, nursing notified. Patient would benefit from continued skilled Physical Therapy to improve functional independence and quality of life.          Patient's/ family goals   home    Time in  80  Time out  220    Total Treatment Time  23 minutes    Evaluation time includes thorough review of current medical information, gathering information on past medical history/social history and prior level of function, completion of standardized testing/informal

## 2022-12-18 NOTE — PROGRESS NOTES
Internal Medicine Progress Note    [facility]    Ed Maldonado. Joshua Trejo., DESOOrquideaI. Jeanmarie Orona D.O., KELVIN Salmeron D.O. Mackenzie Dorman, MSN, APRN, NP-C  Ada Sales. 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 female who was seen and examined today,12/18/2022, at the bedside. Giovanny Fowler underwent enema yesterday and had several bowel movements as a result of this. She then had 2 solid bowel movements this morning. She has developed mild respiratory distress likely in the setting of fluid resuscitation. We discussed the need to become increasingly ambulatory as we resume many of her own medications. Review of System:   Constitutional:   Admits to generalized malaise and fatigue. HEENT:   Denies ear pain, sore throat, sinus or eye problems. Nasal cannula oxygen is in place. Cardiovascular:   Denies any chest pain, irregular heartbeats, or palpitations. Respiratory:   Interval development of shortness of breath. Gastrointestinal:   Several bowel movements yesterday. Genitourinary:    Denies any urgency, frequency, hematuria. Voiding  without difficulty. Extremities:   Denies lower extremity swelling, edema or cyanosis. Neurology:    Denies any headache or focal neurological deficits, generalized weakness  Psch:   Denies being anxious or depressed. Musculoskeletal:    Denies  myalgias, joint complaints or back pain. Integumentary:   Denies any rashes, ulcers, or excoriations. Denies bruising. Hematologic/Lymphatic:  Denies bruising or bleeding. Physical Exam:  I/O this shift:  In: 60 [P.O.:60]  Out: -     Intake/Output Summary (Last 24 hours) at 12/18/2022 0621  Last data filed at 12/17/2022 2024  Gross per 24 hour   Intake 60 ml   Output --   Net 60 ml   No intake/output data recorded. Patient Vitals for the past 96 hrs (Last 3 readings):   Weight   12/17/22 1152 140 lb (63.5 kg)   12/17/22 0309 140 lb (63.5 kg)   12/17/22 0240 140 lb (63.5 kg)     Vital Signs:   Blood pressure 129/64, pulse 74, temperature 97.6 °F (36.4 °C), temperature source Oral, resp. rate 24, height 4' 11\" (1.499 m), weight 140 lb (63.5 kg), SpO2 100 %. General appearance:  Alert, responsive, oriented to person, place, and time. Well preserved, alert, no distress. Head:  Normocephalic. No masses, lesions or tenderness. Eyes:  PERRLA. EOMI. Sclera clear. Buccal mucosa moist.  ENT:  Ears normal. Mucosa normal.  Nasal cannula oxygen is in place. Neck:    Supple. Trachea midline. No thyromegaly. No JVD. No bruits. Heart:    Rhythm regular. Rate controlled. Systolic murmur. Lungs:    Symmetrical.  Rales at the bases posteriorly. Abdomen:   Soft. Mildly tender to palpation diffusely with voluntary guarding elicited. Bowel sounds are active. Extremities:    Peripheral pulses present. No peripheral edema. No ulcers. No cyanosis. No clubbing. Neurologic:    Alert x 3. No focal deficit. Cranial nerves grossly intact. No focal weakness. Psych:   Behavior is normal. Mood appears normal. Speech is not rapid and/or pressured. Musculoskeletal:   Spine ROM normal. Muscular strength intact. Gait not assessed. Integumentary:  No rashes  Skin normal color and texture.   Genitalia/Breast:  Deferred    Medication:  Scheduled Meds:   furosemide  40 mg IntraVENous Once    aspirin  81 mg Oral Daily    atorvastatin  20 mg Oral Nightly    furosemide 20 mg Oral BID    metoprolol tartrate  25 mg Oral BID    [Held by provider] lisinopril  10 mg Oral BID    naloxegol  25 mg Oral QAM AC    pantoprazole  40 mg IntraVENous Daily    metroNIDAZOLE  500 mg IntraVENous Q8H    cefTRIAXone (ROCEPHIN) IV  1,000 mg IntraVENous Q24H    sodium chloride flush  5-40 mL IntraVENous 2 times per day    enoxaparin  40 mg SubCUTAneous Daily    insulin lispro  0-4 Units SubCUTAneous 6 times per day    docusate sodium  100 mg Oral Daily     Continuous Infusions:   sodium chloride      dextrose         Objective Data:  CBC with Differential:    Lab Results   Component Value Date/Time    WBC 14.8 12/18/2022 03:08 AM    RBC 3.54 12/18/2022 03:08 AM    HGB 11.7 12/18/2022 03:08 AM    HCT 34.3 12/18/2022 03:08 AM     12/18/2022 03:08 AM    MCV 96.9 12/18/2022 03:08 AM    MCH 33.1 12/18/2022 03:08 AM    MCHC 34.1 12/18/2022 03:08 AM    RDW 13.4 12/18/2022 03:08 AM    LYMPHOPCT 8.1 12/18/2022 03:08 AM    MONOPCT 6.7 12/18/2022 03:08 AM    BASOPCT 0.1 12/18/2022 03:08 AM    MONOSABS 1.00 12/18/2022 03:08 AM    LYMPHSABS 1.20 12/18/2022 03:08 AM    EOSABS 0.00 12/18/2022 03:08 AM    BASOSABS 0.02 12/18/2022 03:08 AM     BMP:    Lab Results   Component Value Date/Time     12/18/2022 03:08 AM    K 3.5 12/18/2022 03:08 AM    K 4.1 12/17/2022 04:50 AM     12/18/2022 03:08 AM    CO2 21 12/18/2022 03:08 AM    BUN 11 12/18/2022 03:08 AM    LABALBU 3.3 12/18/2022 03:08 AM    CREATININE 0.4 12/18/2022 03:08 AM    CALCIUM 8.5 12/18/2022 03:08 AM    GFRAA >60 06/23/2022 05:50 AM    LABGLOM >60 12/18/2022 03:08 AM    GLUCOSE 125 12/18/2022 03:08 AM       Assessment:  Sepsis secondary to urinary tract infection  Fecal impaction of 11.3 cm stool segment with associated proctitis  Asymptomatic coronary artery disease  Non-insulin-dependent diabetes mellitus type 2  Opioid induced constipation on chronic fentanyl patches    Plan:   IV fluids will be discontinued as she has developed some volume retention. A one-time dose of IV Lasix will be administered we will resume her oral Lasix therapy. She is having several bowel movements with significant improvement in her presenting pain. Clear liquid diet will be instituted as well home medications. We appreciate the input from the general surgery and GI teams. Chronic comorbidities are being monitored and we have encouraged work with the therapy teams. Antibiotics are being employed for the urinary tract infection and proctitis as we await final culture results continue current therapy. See orders for further plan of care. More than 50% of my time was spent at the bedside counseling/coordinating care with the patient and/or family with face to face contact. This time was spent reviewing notes and laboratory data as well as instructing and counseling the patient. Time I spent with the family or surrogate(s) is included only if the patient was incapable of providing the necessary information or participating in medical decisions. I also discussed the differential diagnosis and all of the proposed management plans with the patient and individuals accompanying the patient. I am readily available for any further decision-making and intervention.        Amaris Charles DO, F.A.C.O.I.  12/18/2022  6:21 AM

## 2022-12-19 LAB
ALBUMIN SERPL-MCNC: 3.2 G/DL (ref 3.5–5.2)
ALP BLD-CCNC: 79 U/L (ref 35–104)
ALT SERPL-CCNC: 13 U/L (ref 0–32)
ANION GAP SERPL CALCULATED.3IONS-SCNC: 20 MMOL/L (ref 7–16)
AST SERPL-CCNC: 47 U/L (ref 0–31)
BASOPHILS ABSOLUTE: 0.01 E9/L (ref 0–0.2)
BASOPHILS RELATIVE PERCENT: 0.1 % (ref 0–2)
BILIRUB SERPL-MCNC: 0.8 MG/DL (ref 0–1.2)
BILIRUBIN DIRECT: <0.2 MG/DL (ref 0–0.3)
BILIRUBIN, INDIRECT: ABNORMAL MG/DL (ref 0–1)
BUN BLDV-MCNC: 20 MG/DL (ref 6–23)
CALCIUM SERPL-MCNC: 8.2 MG/DL (ref 8.6–10.2)
CHLORIDE BLD-SCNC: 100 MMOL/L (ref 98–107)
CO2: 19 MMOL/L (ref 22–29)
CREAT SERPL-MCNC: 0.6 MG/DL (ref 0.5–1)
EOSINOPHILS ABSOLUTE: 0 E9/L (ref 0.05–0.5)
EOSINOPHILS RELATIVE PERCENT: 0 % (ref 0–6)
GFR SERPL CREATININE-BSD FRML MDRD: >60 ML/MIN/1.73
GLUCOSE BLD-MCNC: 140 MG/DL (ref 74–99)
HCT VFR BLD CALC: 34.5 % (ref 34–48)
HEMOGLOBIN: 11.4 G/DL (ref 11.5–15.5)
IMMATURE GRANULOCYTES #: 0.1 E9/L
IMMATURE GRANULOCYTES %: 0.6 % (ref 0–5)
LYMPHOCYTES ABSOLUTE: 0.75 E9/L (ref 1.5–4)
LYMPHOCYTES RELATIVE PERCENT: 4.7 % (ref 20–42)
MAGNESIUM: 1.8 MG/DL (ref 1.6–2.6)
MCH RBC QN AUTO: 32.4 PG (ref 26–35)
MCHC RBC AUTO-ENTMCNC: 33 % (ref 32–34.5)
MCV RBC AUTO: 98 FL (ref 80–99.9)
METER GLUCOSE: 144 MG/DL (ref 74–99)
MONOCYTES ABSOLUTE: 1.05 E9/L (ref 0.1–0.95)
MONOCYTES RELATIVE PERCENT: 6.6 % (ref 2–12)
NEUTROPHILS ABSOLUTE: 13.96 E9/L (ref 1.8–7.3)
NEUTROPHILS RELATIVE PERCENT: 88 % (ref 43–80)
PDW BLD-RTO: 13.2 FL (ref 11.5–15)
PHOSPHORUS: 3.3 MG/DL (ref 2.5–4.5)
PLATELET # BLD: 159 E9/L (ref 130–450)
PMV BLD AUTO: 10.5 FL (ref 7–12)
POTASSIUM SERPL-SCNC: 3.1 MMOL/L (ref 3.5–5)
RBC # BLD: 3.52 E12/L (ref 3.5–5.5)
SODIUM BLD-SCNC: 139 MMOL/L (ref 132–146)
TOTAL PROTEIN: 5.8 G/DL (ref 6.4–8.3)
WBC # BLD: 15.9 E9/L (ref 4.5–11.5)

## 2022-12-19 PROCEDURE — 6370000000 HC RX 637 (ALT 250 FOR IP): Performed by: INTERNAL MEDICINE

## 2022-12-19 PROCEDURE — 82962 GLUCOSE BLOOD TEST: CPT

## 2022-12-19 PROCEDURE — 6360000002 HC RX W HCPCS: Performed by: INTERNAL MEDICINE

## 2022-12-19 PROCEDURE — 1200000000 HC SEMI PRIVATE

## 2022-12-19 PROCEDURE — 85025 COMPLETE CBC W/AUTO DIFF WBC: CPT

## 2022-12-19 PROCEDURE — 97530 THERAPEUTIC ACTIVITIES: CPT

## 2022-12-19 PROCEDURE — 84100 ASSAY OF PHOSPHORUS: CPT

## 2022-12-19 PROCEDURE — 2500000003 HC RX 250 WO HCPCS: Performed by: INTERNAL MEDICINE

## 2022-12-19 PROCEDURE — 83735 ASSAY OF MAGNESIUM: CPT

## 2022-12-19 PROCEDURE — 97165 OT EVAL LOW COMPLEX 30 MIN: CPT

## 2022-12-19 PROCEDURE — 80048 BASIC METABOLIC PNL TOTAL CA: CPT

## 2022-12-19 PROCEDURE — 36415 COLL VENOUS BLD VENIPUNCTURE: CPT

## 2022-12-19 PROCEDURE — 2580000003 HC RX 258: Performed by: INTERNAL MEDICINE

## 2022-12-19 PROCEDURE — C9113 INJ PANTOPRAZOLE SODIUM, VIA: HCPCS | Performed by: INTERNAL MEDICINE

## 2022-12-19 PROCEDURE — 80076 HEPATIC FUNCTION PANEL: CPT

## 2022-12-19 RX ORDER — PSEUDOEPHEDRINE HCL 30 MG
100 TABLET ORAL 2 TIMES DAILY
Qty: 60 CAPSULE | Refills: 0 | Status: SHIPPED | OUTPATIENT
Start: 2022-12-19 | End: 2023-01-18

## 2022-12-19 RX ORDER — POTASSIUM CHLORIDE 20 MEQ/1
20 TABLET, EXTENDED RELEASE ORAL DAILY
Qty: 30 TABLET | Refills: 0 | Status: SHIPPED | OUTPATIENT
Start: 2022-12-19

## 2022-12-19 RX ORDER — CEFDINIR 300 MG/1
300 CAPSULE ORAL 2 TIMES DAILY
Qty: 10 CAPSULE | Refills: 0 | Status: SHIPPED | OUTPATIENT
Start: 2022-12-19 | End: 2022-12-24

## 2022-12-19 RX ORDER — POLYETHYLENE GLYCOL 3350 17 G/17G
17 POWDER, FOR SOLUTION ORAL 2 TIMES DAILY
Qty: 527 G | Refills: 3 | Status: SHIPPED | OUTPATIENT
Start: 2022-12-19 | End: 2023-01-18

## 2022-12-19 RX ORDER — FENTANYL CITRATE 0.05 MG/ML
12.5 INJECTION, SOLUTION INTRAMUSCULAR; INTRAVENOUS ONCE
Status: COMPLETED | OUTPATIENT
Start: 2022-12-19 | End: 2022-12-19

## 2022-12-19 RX ORDER — FENTANYL 50 UG/H
1 PATCH TRANSDERMAL
Status: DISCONTINUED | OUTPATIENT
Start: 2022-12-19 | End: 2022-12-20 | Stop reason: HOSPADM

## 2022-12-19 RX ORDER — SENNA AND DOCUSATE SODIUM 50; 8.6 MG/1; MG/1
1 TABLET, FILM COATED ORAL DAILY
Qty: 30 TABLET | Refills: 3 | Status: SHIPPED | OUTPATIENT
Start: 2022-12-19 | End: 2023-01-18

## 2022-12-19 RX ORDER — METRONIDAZOLE 500 MG/1
500 TABLET ORAL 3 TIMES DAILY
Qty: 15 TABLET | Refills: 0 | Status: SHIPPED | OUTPATIENT
Start: 2022-12-19 | End: 2022-12-24

## 2022-12-19 RX ORDER — HYDROCODONE BITARTRATE AND ACETAMINOPHEN 5; 325 MG/1; MG/1
1 TABLET ORAL EVERY 4 HOURS PRN
Status: DISCONTINUED | OUTPATIENT
Start: 2022-12-19 | End: 2022-12-20 | Stop reason: HOSPADM

## 2022-12-19 RX ADMIN — METRONIDAZOLE 500 MG: 500 INJECTION, SOLUTION INTRAVENOUS at 07:00

## 2022-12-19 RX ADMIN — ACETAMINOPHEN 650 MG: 325 TABLET ORAL at 10:19

## 2022-12-19 RX ADMIN — POTASSIUM CHLORIDE 40 MEQ: 1500 TABLET, EXTENDED RELEASE ORAL at 08:08

## 2022-12-19 RX ADMIN — FENTANYL CITRATE 12.5 MCG: 50 INJECTION INTRAMUSCULAR; INTRAVENOUS at 10:54

## 2022-12-19 RX ADMIN — Medication 10 ML: at 19:58

## 2022-12-19 RX ADMIN — HYDRALAZINE HYDROCHLORIDE 5 MG: 20 INJECTION INTRAMUSCULAR; INTRAVENOUS at 10:32

## 2022-12-19 RX ADMIN — HYDROCODONE BITARTRATE AND ACETAMINOPHEN 1 TABLET: 5; 325 TABLET ORAL at 15:06

## 2022-12-19 RX ADMIN — DOCUSATE SODIUM 100 MG: 100 CAPSULE, LIQUID FILLED ORAL at 08:01

## 2022-12-19 RX ADMIN — NALOXEGOL OXALATE 25 MG: 12.5 TABLET, FILM COATED ORAL at 06:52

## 2022-12-19 RX ADMIN — ASPIRIN 81 MG: 81 TABLET, COATED ORAL at 08:01

## 2022-12-19 RX ADMIN — METRONIDAZOLE 500 MG: 500 INJECTION, SOLUTION INTRAVENOUS at 12:59

## 2022-12-19 RX ADMIN — METOPROLOL TARTRATE 25 MG: 25 TABLET, FILM COATED ORAL at 19:57

## 2022-12-19 RX ADMIN — ATORVASTATIN CALCIUM 20 MG: 20 TABLET, FILM COATED ORAL at 19:57

## 2022-12-19 RX ADMIN — HYDROCODONE BITARTRATE AND ACETAMINOPHEN 1 TABLET: 7.5; 325 TABLET ORAL at 00:05

## 2022-12-19 RX ADMIN — FUROSEMIDE 20 MG: 20 TABLET ORAL at 19:57

## 2022-12-19 RX ADMIN — HYDROCODONE BITARTRATE AND ACETAMINOPHEN 1 TABLET: 5; 325 TABLET ORAL at 19:57

## 2022-12-19 RX ADMIN — HYDROCODONE BITARTRATE AND ACETAMINOPHEN 1 TABLET: 7.5; 325 TABLET ORAL at 09:06

## 2022-12-19 RX ADMIN — CEFTRIAXONE 1000 MG: 1 INJECTION, POWDER, FOR SOLUTION INTRAMUSCULAR; INTRAVENOUS at 08:01

## 2022-12-19 RX ADMIN — DOCUSATE SODIUM 100 MG: 100 CAPSULE, LIQUID FILLED ORAL at 19:57

## 2022-12-19 RX ADMIN — METOPROLOL TARTRATE 25 MG: 25 TABLET, FILM COATED ORAL at 08:01

## 2022-12-19 RX ADMIN — Medication 10 ML: at 08:02

## 2022-12-19 RX ADMIN — METRONIDAZOLE 500 MG: 500 INJECTION, SOLUTION INTRAVENOUS at 20:04

## 2022-12-19 RX ADMIN — FUROSEMIDE 20 MG: 20 TABLET ORAL at 08:01

## 2022-12-19 RX ADMIN — PANTOPRAZOLE SODIUM 40 MG: 40 INJECTION, POWDER, FOR SOLUTION INTRAVENOUS at 08:01

## 2022-12-19 RX ADMIN — ENOXAPARIN SODIUM 40 MG: 100 INJECTION SUBCUTANEOUS at 08:01

## 2022-12-19 ASSESSMENT — PAIN SCALES - GENERAL
PAINLEVEL_OUTOF10: 7
PAINLEVEL_OUTOF10: 0

## 2022-12-19 NOTE — PROGRESS NOTES
[address] [facility]  [facility] [place] [place]. [place]        Date:2022                                                  Patient Name: Ekaterina Negron    MRN: [id number]    : 1935    Room: [address] [place] [address]      Evaluating OT: Albert Rivers OTR/L #[id number]     Referring Provider and Specific Provider Orders/Date:      22  OT eval and treat  Start:  22,   End:  22,   ONE TIME,   Standing Count:  1 Occurrences,   R         Naif Santo,       Placement Recommendation: Subacute vs Home Health Occupational Therapy and  supervision if patient is able to meet goals       Diagnosis:   1. Fecal impaction in rectum ([facility] [address]    2. Drug-induced constipation    3. Calculus of gallbladder without cholecystitis without obstruction    4. Coloproctitis    5.  Intrahepatic bile duct dilation         Surgery: None       Pertinent Medical History:       Past Medical History:   Diagnosis Date    Arthritis     CAD (coronary artery disease)         Hyperlipidemia     Hypertension     Latex allergy     Myocardial infarction [facility])     Shoulder pain     Sigmoid volvulus ([facility] [address]     2022         Past Surgical History:   Procedure Laterality Date    ANESTHESIA NERVE BLOCK Bilateral 2019    BILATERAL SUPRASCAPULAR BLOCK UNDER ULTRASOUND  (CPT: 36912) performed by Cindy Zhao MD at [facility] Bilateral 10/1/2019    BILATERAL SUPRASCAPUER BLOCK UNDER ULTRASOUND GUIDANCE performed by Cindy Zhao MD at [address] [place] [address]  2012    CARDIAC SURGERY  2012    triple bypass    CARPAL TUNNEL RELEASE      CARPAL TUNNEL RELEASE Right 2018    COLONOSCOPY N/A 2022    COLONOSCOPY FLEXIBLE W/ DECOMPRESSION performed by Raúl Granados MD at [place] [address]      EYE SURGERY Bilateral cataracts    NERVE BLOCK  11/24/14    bilateral shoulder injection #1    NERVE BLOCK Bilateral 12/1/2014    alexys shoulder injections  #2    NERVE BLOCK N/A 1/7/2015    cervical epidural  #1    NERVE BLOCK N/A 1 21 15    cerv ep #2    NERVE BLOCK Bilateral 01/23/2017    Bilateral shoulder injection #1    NERVE BLOCK Bilateral 06/25/2019    suprascapular block with sedation    NERVE BLOCK Bilateral 10/01/2019    suprascapular     OTHER SURGICAL HISTORY Bilateral 03/17/2020    bilateral suprascapular nerve pulsed RFA under ultrasound guidance    PAIN MANAGEMENT PROCEDURE Bilateral 3/17/2020    BILATERAL SUPRASCAPULAR NERVE RADIOFREQUENCY ABLATION WITH ULTRASOUND (CPT 06139 [id number]) SEDATION performed by Alicia Ford MD at [address] [facility] N/CARPAL TUNNEL SURG Right 4/25/2018    RIGHT HAND CARPAL TUNNEL RELEASE performed by Steph Mojica MD at [address] [place] [address]      Precautions:  Fall Risk, up with assistance, incontinence of bowels      Assessment of current deficits    [x] Functional mobility  [x]ADLs  [x] Strength               [x]Cognition    [x] Functional transfers   [x] IADLs         [x] Safety Awareness   [x]Endurance    [] Fine Coordination              [x] Balance      [] Vision/perception   []Sensation     []Gross Motor Coordination  [] ROM  [] Delirium                   [] Motor Control     OT PLAN OF CARE   OT POC based on physician orders, patient diagnosis and results of clinical assessment    Frequency/Duration 1-3 days/wk for 2 weeks PRN     Specific OT Treatment Interventions to include:   * Instruction/training on adapted ADL techniques and AE recommendations to increase functional independence within precautions       * Training on energy conservation strategies, correct breathing pattern and techniques to improve independence/tolerance for self-care routine  * Functional transfer/mobility training/DME recommendations for increased independence, safety, and fall prevention  * Patient/Family education to increase follow through with safety techniques and functional independence  * Recommendation of environmental modifications for increased safety with functional transfers/mobility and ADLs  * Cognitive retraining/development of therapeutic activities to improve problem solving, judgement, memory, and attention for increased safety/participation in ADL/IADL tasks  * Therapeutic exercise to improve motor endurance, ROM, and functional strength for ADLs/functional transfers  * Therapeutic activities to facilitate/challenge dynamic balance, stand tolerance for increased safety and independence with ADLs  * Therapeutic activities to facilitate gross/fine motor skills for increased independence with ADLs  * Positioning to improve skin integrity, interaction with environment and functional independence    Recommended Adaptive Equipment: TBD      Home Living: Patient lives with son in a ranch home  with 1 step, with rail  to enter        Equipment owned: Wheeled Walker and [employer] EQO    Prior Level of Function: pt reports being independent with ADLs , son completes most IADLs but patient states she helps with laundry in the basement; ambulated with independently. Pain Level: pt denied pain; Nursing notified. Cognition: A&O: 1/4 - self only; Follows 1-2 step directions ; pt lethargic and needing cueing to keep eyes open.     Memory: impaired    Sequencing: impaired    Problem solving: poor   Judgement/safety: poor    [facility]   AM-PAC Daily Activity Inpatient   How much help for putting on and taking off regular lower body clothing?: Total  How much help for Bathing?: Total  How much help for Toileting?: Total  How much help for putting on and taking off regular upper body clothing?: A Lot  How much help for taking care of personal grooming?: A Lot  How much help for eating meals?: A Little  AM-[facility] Inpatient Daily Activity Raw Score: 10  AM-PAC Inpatient ADL T-Scale Score : rest: 67 bpm HR with activity:  bpm HR at end of session:  bpm   SpO2 at rest: 96% SpO2 with activity: % SpO2 at end of session: %   BP at rest:  BP with activity:  BP at end of session:      Comments: RN cleared patient for OT. Upon arrival patient in supine. Therapist facilitated and instructed pt on adapted  techniques & compensatory strategies to improve safety and independence with basic ADLs, bed mobility, functional transfers and mobility to allow pt to achieve highest level of independence and safely. Pt demonstrated poor understanding of education & follow through. At end of session, patient was supine, alarm on, with call light and phone within reach, all lines and tubes intact. Overall, patient demonstrated  decreased independence and safety during completion of ADL tasks. Pt would benefit from continued skilled OT to increase safety and independence with completion of ADL tasks and functional mobility for improved quality of life. Treatment: OT treatment provided this date includes:   Instructions/training on safety, sequencing, and adapted techniques for completion of ADLs. Facilitated bed mobility with cues for proper body mechanics and sequencing to prepare for ADL completion. Instruction/training on safe functional mobility/transfer techniques including hand and feet placement   Facilitated proper positioning/alignment to improve interaction with environment, breathing, overall functioning and decrease/prevent edema. Sitting EOB <5 minutes to improve dynamic sitting balance and activity tolerance during ADL    Rehab Potential: Good for established goals. Patient / Family Goal: not stated       Patient and/or family were instructed on functional diagnosis, prognosis/goals and OT plan of care. Demonstrated poor understanding.      Eval Complexity: Low    Time In and Out: 8:30 - 8:35 AM ; 9:15 - 9:40 AM    Total Treatment Time: 10      Min Units   OT Eval Low 97165  X  1    OT Eval Medium 31130      OT Eval High 28016      OT Re-Eval [id number]            ADL/Self Care 44515     Therapeutic Activities 78673 10 1   Therapeutic Ex 80581       Orthotic Management 04265       Manual 00158     Neuro Re-Ed 59839       Non-Billable Time        Evaluation Time additionally includes thorough review of current medical information, gathering information on past medical history/social history and prior level of function, interpretation of standardized testing/informal observation of tasks, assessment of data and development of plan of care and goals.         Evaluating OT: Yvrose Goins OTR/L #[id number]

## 2022-12-19 NOTE — CARE COORDINATION
CM note: [facility] home care has accepted patient, informed charge nurse, however patient is not leaving now as patient became profusely sweating and complained of abdominal pain after CM met with her. Discharge being held.

## 2022-12-19 NOTE — DISCHARGE SUMMARY
Internal Medicine Progress Note     [facility]     Ismael Lizarraga. Bronson Sprague., F.A.C.O.I. Carlie Sarkar D.O., ADIA.SANDEEPOMARIN Hernandez D.O. Anusha Sears, MSN, APRN, NP-C  Serena Mojica. Nicole Novoa, MSN, [address] [facility]       Internal Medicine  Discharge Summary    NAME: Namita Gomez  :  1935  MRN:  [id number]  Cathryn Miltno MD  ADMITTED: 2022      DISCHARGED: 22    ADMITTING PHYSICIAN: Azeb Guerrero DO    CONSULTANT(S):   IP CONSULT TO SOCIAL WORK  IP CONSULT TO GENERAL SURGERY  IP CONSULT TO GI     ADMITTING DIAGNOSIS:   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]  Drug-induced constipation [K59.03]  Calculus of gallbladder without cholecystitis without obstruction [K80.20]  Fecal impaction in rectum ([address] [K56.41]  Intrahepatic bile duct dilation [K83.8]  Fecal impaction ([address] [K56.41]     DISCHARGE DIAGNOSES:   Sepsis secondary to urinary tract infection  Fecal impaction of 11.3 cm stool segment with associated proctitis  Asymptomatic coronary artery disease  Non-insulin-dependent diabetes mellitus type 2  Opioid induced 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 She has not had a bowel movement in 3 days. ER work-up revealed large fecal impaction with a component of proctitis. Also revealed UTI. BC showed mild leukocytosis with neutrophilic predominance. Metabolic panel was otherwise unremarkable. She met criteria for sepsis with UTI based upon leukocytosis and tachycardia. Case was discussed with ER physician. Patient was previously admitted by Dr. Loco Soares service. We have requested that the patient be offered to their service before being admitted to us. We have confirmed that they do not wish to admit the patient and we will gladly accept the patient for admission.      Patient is seen and examined at bedside with son present. We confirmed the above given history. She is on chronic narcotics in the form of Norco and has recently been transition to fentanyl patches in setting of longstanding shoulder arthritis for the patient previously refused surgery. During her last hospitalization she had decompressive colonoscopy for sigmoid volvulus. She denies any fevers or chills. Denies sick contacts or exposures. No headache or acute neurological symptoms aside from that generalized weakness. No chest pain, palpitations or fluttering. Positive for a longstanding history of coronary artery disease with prior coronary artery bypass grafting which has been stable for many years on current medication. No shortness of breath at rest or exertion, cough or URI complaints. Lower abdominal pain and cramping reported with constipation. No nausea, no vomiting, no bowel changes. She was intolerant of soapsuds enema and has refused digital disimpaction in the emergency department. He admits to dysuria and frequency changes with no hematuria.       LABS[de-identified]  Lab Results   Component Value Date    WBC 15.9 (H) 12/19/2022    HGB 11.4 (L) 12/19/2022    HCT 34.5 12/19/2022     12/19/2022     12/19/2022    K 3.1 (L) 12/19/2022     12/19/2022    CREATININE 0.6 12/19/2022    BUN 20 12/19/2022    CO2 19 (L) 12/19/2022    GLUCOSE 140 (H) 12/19/2022    ALT 13 12/19/2022    AST 47 (H) 12/19/2022    INR 1.0 09/01/2016     Lab Results   Component Value Date    INR 1.0 09/01/2016    PROTIME 10.8 09/01/2016      Lab Results   Component Value Date    TSH 0.236 (L) 12/18/2022     Lab Results   Component Value Date    TRIG 94 12/18/2022     Lab Results   Component Value Date    HDL 57 12/18/2022     Lab Results   Component Value Date    LDLCALC 117 (H) 12/18/2022     Lab Results   Component Value Date    LABA1C 4.8 12/18/2022       IMAGING:  CT ABDOMEN PELVIS W IV CONTRAST Additional Contrast? None    Result Date: 12/17/2022  EXAMINATION: CT OF THE ABDOMEN AND PELVIS WITH CONTRAST 12/17/2022 5:55 am TECHNIQUE: CT of the abdomen and pelvis was performed with the administration of intravenous contrast. Multiplanar reformatted images are provided for review. Automated exposure control, iterative reconstruction, and/or weight based adjustment of the mA/kV was utilized to reduce the radiation dose to as low as reasonably achievable. COMPARISON: None. HISTORY: ORDERING SYSTEM PROVIDED HISTORY: Reported constipation with multiple episodes of watery stool/diarrhea TECHNOLOGIST PROVIDED HISTORY: Additional Contrast?->None Reason for exam:->Reported constipation with multiple episodes of watery stool/diarrhea Decision Support Exception - unselect if not a suspected or confirmed emergency medical condition->Emergency Medical Condition (MA) FINDINGS: Lower Chest: Cardiomegaly. Prosthetic aortic valve. No pericardial effusions. Mild right basilar infiltrate may represent aspiration or other pneumonitis. Organs: Cholelithiasis. Distended gallbladder and moderate intrahepatic and extrahepatic biliary ductal dilatation. The common bile duct is dilated up to 14 mm. No obstructing ampullary mass lesion is identified on CT, however, clinical correlation is recommended. Consider further evaluation by ERCP/MRCP as indicated. 11 mm wall calcified splenic cyst.  Otherwise, the Liver, biliary tree, bilateral adrenal glands, bilateral kidneys, spleen and pancreas are normal. GI/Bowel: 11.3 cm rectal stool impaction. No overt obstruction. Pelvis: No adnexal mass. Trace intrapelvic free fluid of uncertain etiology. Perirectal infiltration suggesting proctitis. Peritoneum/Retroperitoneum: No adenopathy or fluid. Bones/Soft Tissues: No acute osseous or soft tissue abnormality. 1. 11.3 cm rectal stool impaction. Perirectal infiltration suggesting proctitis. 2. Cholelithiasis.   Distended gallbladder with moderate intrahepatic and extrahepatic biliary ductal dilatation. No ampullary mass lesion is identified on CT. Consider further evaluation by ERCP/MRCP as clinically indicated. 3. Mild right basilar pulmonary infiltrate may represent aspiration or other pneumonitis. RECOMMENDATIONS: Careful clinical correlation and follow up recommended. XR CHEST PORTABLE    Result Date: 12/18/2022  EXAMINATION: ONE XRAY VIEW OF THE CHEST 12/18/2022 8:22 am COMPARISON: None. HISTORY: ORDERING SYSTEM PROVIDED HISTORY: SOB TECHNOLOGIST PROVIDED HISTORY: Reason for exam:->SOB FINDINGS: The heart is enlarged. Postop sternotomy changes are noted The lungs are clear. There is no focal infiltrate. There is chronic elevation of the right hemidiaphragm. There is a trace left pleural effusion. 1. Cardiomegaly. There are no findings of gross pulmonary edema 2. Trace left pleural effusion. 3. There are no findings of pneumonia         HOSPITAL COURSE:   Stanford Drake did very well throughout the hospitalization. She originally presented to the hospital with fecal impaction in the setting of opioid-induced constipation. She had been noncompliant with her outpatient medication, particularly Movantik. She was resumed on appropriate bowel regimen and has had multiple bowel movements throughout the hospitalization. She was also found to be suffering from a urinary tract infection and cultures remain negative at this point. In the setting of the fecal impaction, she had developed proctitis and will complete a course of Omnicef and Flagyl upon discharge. Otherwise, she has become ambulatory and is tolerating a diet. In the setting of fluid resuscitation, she did develop some mild volume overload and required diuresis. She has been resumed on her oral diuretic therapy. She has become ambulatory and is deemed acceptable for discharge back home. Patient is medically stable and acceptable for discharge today.     BRIEF PHYSICAL EXAMINATION AND LABORATORIES ON DAY OF DISCHARGE:  VITALS:  BP (!) 124/58   Pulse 79   Temp 98.4 °F (36.9 °C)   Resp 18   Ht 4' 11\" (1.499 m)   Wt 140 lb (63.5 kg)   SpO2 93%   BMI 28.28 kg/m²     General appearance:  Alert, responsive, oriented to person, place, and time. Well preserved, alert, no distress. Head:  Normocephalic. No masses, lesions or tenderness. Eyes:  PERRLA. EOMI. Sclera clear. Buccal mucosa moist.  ENT:  Ears normal. Mucosa normal.  Nasal cannula oxygen is in place. Neck:    Supple. Trachea midline. No thyromegaly. No JVD. No bruits. Heart:    Rhythm regular. Rate controlled. Systolic murmur. Lungs:    Symmetrical.  Rales at the bases posteriorly. Abdomen:   Abdominal pain to palpation has resolved. Extremities:    Peripheral pulses present. No peripheral edema. No ulcers. No cyanosis. No clubbing. Neurologic:    Alert x 3. No focal deficit. Cranial nerves grossly intact. No focal weakness. Psych:   Behavior is normal. Mood appears normal. Speech is not rapid and/or pressured. Musculoskeletal:   Spine ROM normal. Muscular strength intact. Gait not assessed. Integumentary:  No rashes  Skin normal color and texture. Genitalia/Breast:  Deferred      DISPOSITION:  The patient's condition is stable. At this time the patient is without objective evidence of an acute process requiring continuing hospitalization or inpatient management. They are stable for discharge with outpatient follow-up. I have spoken with the patient and discussed the results of the current hospitalization, in addition to providing specific details for the plan of care and counseling regarding the diagnosis and prognosis. The plan has been discussed in detail and they are aware of the specific conditions for emergent return, as well as the importance of follow-up.   Their questions are answered at this time and they are agreeable with the plan for discharge to home    DISCHARGE MEDICATIONS:   Current Discharge Medication List             Details   cefdinir (OMNICEF) 300 MG capsule Take 1 capsule by mouth 2 times daily for 5 days  Qty: 10 capsule, Refills: 0      docusate sodium (COLACE, DULCOLAX) 100 MG CAPS Take 100 mg by mouth 2 times daily  Qty: 60 capsule, Refills: 0      metroNIDAZOLE (FLAGYL) 500 MG tablet Take 1 tablet by mouth 3 times daily for 5 days  Qty: 15 tablet, Refills: 0      polyethylene glycol (GLYCOLAX) 17 g packet Take 17 g by mouth 2 times daily  Qty: 527 g, Refills: 3      sennosides-docusate sodium (SENOKOT-S) 8.6-50 MG tablet Take 1 tablet by mouth daily  Qty: 30 tablet, Refills: 3      potassium chloride (KLOR-CON M) 20 MEQ extended release tablet Take 1 tablet by mouth daily  Qty: 30 tablet, Refills: 0                Details   naloxegol (MOVANTIK) 25 MG TABS tablet Take 1 tablet by mouth every morning (before breakfast)  Qty: 30 tablet, Refills: 3                Details   lidocaine (LIDODERM) 5 % Place 1 patch onto the skin daily 12 hours on, 12 hours off.      furosemide (LASIX) 20 MG tablet Take 20 mg by mouth 2 times daily      atorvastatin (LIPITOR) 20 MG tablet TAKE ONE TABLET BY MOUTH EVERY DAY FOR CHOLESTEROL  Refills: 3      HYDROcodone-acetaminophen (NORCO) 7.5-325 MG per tablet 6 times daily. diphenhydrAMINE (BENADRYL) 25 MG capsule Take 25 mg by mouth every 6 hours as needed for Itching      aspirin 81 MG tablet Take 81 mg by mouth daily       metoprolol (LOPRESSOR) 25 MG tablet Take 12.5 mg by mouth 2 times daily. LISINOPRIL PO Take 10 mg by mouth 2 times daily. FOLLOW UP/INSTRUCTIONS:  This patient is instructed to follow-up with her primary care physician. Patient is instructed to follow-up with the consults listed above as directed by them. she is instructed to resume home medications and take new medications as indicated in the list above. If the patient has a recurrence of symptoms, she is instructed to go to the ED.     Preparing for this patient's

## 2022-12-19 NOTE — CARE COORDINATION
CM note: Discharge order noted. PT eval reviewed and recommendation is for home health PT with 24/7 assist.  Pt lives with her son. She is agreeable with therapy at home, she has a remote history of [facility] home care, no other preference of a provider and gave CM permission to find a home health care provider who is able to service her needs with the upcoming holiday season. Referral called to [facility] with [facility], she will review.

## 2022-12-19 NOTE — CARE COORDINATION
ELINA note: Per Kevin Barragan with [facility], patient has not seen her PCP since August.  Pt will need to see her PCP and obtain order from her for [facility] to see. Referral called to [facility] to see if they are able to accept.

## 2022-12-20 VITALS
BODY MASS INDEX: 28.22 KG/M2 | DIASTOLIC BLOOD PRESSURE: 59 MMHG | HEART RATE: 89 BPM | SYSTOLIC BLOOD PRESSURE: 119 MMHG | RESPIRATION RATE: 16 BRPM | TEMPERATURE: 98.6 F | WEIGHT: 140 LBS | HEIGHT: 59 IN | OXYGEN SATURATION: 95 %

## 2022-12-20 LAB
ALBUMIN SERPL-MCNC: 3.2 G/DL (ref 3.5–5.2)
ALP BLD-CCNC: 115 U/L (ref 35–104)
ALT SERPL-CCNC: 13 U/L (ref 0–32)
ANION GAP SERPL CALCULATED.3IONS-SCNC: 17 MMOL/L (ref 7–16)
AST SERPL-CCNC: 35 U/L (ref 0–31)
BASOPHILS ABSOLUTE: 0.01 E9/L (ref 0–0.2)
BASOPHILS RELATIVE PERCENT: 0.1 % (ref 0–2)
BILIRUB SERPL-MCNC: 0.6 MG/DL (ref 0–1.2)
BILIRUBIN DIRECT: <0.2 MG/DL (ref 0–0.3)
BILIRUBIN, INDIRECT: ABNORMAL MG/DL (ref 0–1)
BUN BLDV-MCNC: 17 MG/DL (ref 6–23)
CALCIUM SERPL-MCNC: 8.4 MG/DL (ref 8.6–10.2)
CHLORIDE BLD-SCNC: 100 MMOL/L (ref 98–107)
CO2: 20 MMOL/L (ref 22–29)
CREAT SERPL-MCNC: 0.5 MG/DL (ref 0.5–1)
EOSINOPHILS ABSOLUTE: 0.01 E9/L (ref 0.05–0.5)
EOSINOPHILS RELATIVE PERCENT: 0.1 % (ref 0–6)
GFR SERPL CREATININE-BSD FRML MDRD: >60 ML/MIN/1.73
GLUCOSE BLD-MCNC: 146 MG/DL (ref 74–99)
HCT VFR BLD CALC: 31.9 % (ref 34–48)
HEMOGLOBIN: 10.7 G/DL (ref 11.5–15.5)
IMMATURE GRANULOCYTES #: 0.06 E9/L
IMMATURE GRANULOCYTES %: 0.5 % (ref 0–5)
LYMPHOCYTES ABSOLUTE: 1.24 E9/L (ref 1.5–4)
LYMPHOCYTES RELATIVE PERCENT: 10.7 % (ref 20–42)
MAGNESIUM: 1.6 MG/DL (ref 1.6–2.6)
MCH RBC QN AUTO: 32 PG (ref 26–35)
MCHC RBC AUTO-ENTMCNC: 33.5 % (ref 32–34.5)
MCV RBC AUTO: 95.5 FL (ref 80–99.9)
MONOCYTES ABSOLUTE: 0.84 E9/L (ref 0.1–0.95)
MONOCYTES RELATIVE PERCENT: 7.3 % (ref 2–12)
NEUTROPHILS ABSOLUTE: 9.41 E9/L (ref 1.8–7.3)
NEUTROPHILS RELATIVE PERCENT: 81.3 % (ref 43–80)
PDW BLD-RTO: 13.2 FL (ref 11.5–15)
PHOSPHORUS: 2 MG/DL (ref 2.5–4.5)
PLATELET # BLD: 176 E9/L (ref 130–450)
PMV BLD AUTO: 10.3 FL (ref 7–12)
POTASSIUM SERPL-SCNC: 2.5 MMOL/L (ref 3.5–5)
RBC # BLD: 3.34 E12/L (ref 3.5–5.5)
SODIUM BLD-SCNC: 137 MMOL/L (ref 132–146)
TOTAL PROTEIN: 5.5 G/DL (ref 6.4–8.3)
WBC # BLD: 11.6 E9/L (ref 4.5–11.5)

## 2022-12-20 PROCEDURE — 6370000000 HC RX 637 (ALT 250 FOR IP): Performed by: INTERNAL MEDICINE

## 2022-12-20 PROCEDURE — 80076 HEPATIC FUNCTION PANEL: CPT

## 2022-12-20 PROCEDURE — 84100 ASSAY OF PHOSPHORUS: CPT

## 2022-12-20 PROCEDURE — 6360000002 HC RX W HCPCS: Performed by: INTERNAL MEDICINE

## 2022-12-20 PROCEDURE — 6370000000 HC RX 637 (ALT 250 FOR IP): Performed by: NURSE PRACTITIONER

## 2022-12-20 PROCEDURE — 2500000003 HC RX 250 WO HCPCS: Performed by: INTERNAL MEDICINE

## 2022-12-20 PROCEDURE — 36415 COLL VENOUS BLD VENIPUNCTURE: CPT

## 2022-12-20 PROCEDURE — 85025 COMPLETE CBC W/AUTO DIFF WBC: CPT

## 2022-12-20 PROCEDURE — 2580000003 HC RX 258: Performed by: INTERNAL MEDICINE

## 2022-12-20 PROCEDURE — 80048 BASIC METABOLIC PNL TOTAL CA: CPT

## 2022-12-20 PROCEDURE — 83735 ASSAY OF MAGNESIUM: CPT

## 2022-12-20 PROCEDURE — C9113 INJ PANTOPRAZOLE SODIUM, VIA: HCPCS | Performed by: INTERNAL MEDICINE

## 2022-12-20 RX ORDER — SENNA AND DOCUSATE SODIUM 50; 8.6 MG/1; MG/1
1 TABLET, FILM COATED ORAL DAILY
COMMUNITY

## 2022-12-20 RX ORDER — POTASSIUM BICARBONATE 25 MEQ/1
50 TABLET, EFFERVESCENT ORAL 2 TIMES DAILY
Status: DISCONTINUED | OUTPATIENT
Start: 2022-12-20 | End: 2022-12-20 | Stop reason: HOSPADM

## 2022-12-20 RX ORDER — DOCUSATE SODIUM 100 MG/1
100 CAPSULE, LIQUID FILLED ORAL 2 TIMES DAILY
COMMUNITY

## 2022-12-20 RX ORDER — FENTANYL 50 UG/H
1 PATCH TRANSDERMAL
COMMUNITY

## 2022-12-20 RX ORDER — POTASSIUM CHLORIDE 20 MEQ/1
20 TABLET, EXTENDED RELEASE ORAL 2 TIMES DAILY
COMMUNITY

## 2022-12-20 RX ORDER — POLYETHYLENE GLYCOL 3350 17 G/17G
17 POWDER, FOR SOLUTION ORAL 2 TIMES DAILY
COMMUNITY

## 2022-12-20 RX ADMIN — FUROSEMIDE 20 MG: 20 TABLET ORAL at 09:02

## 2022-12-20 RX ADMIN — METRONIDAZOLE 500 MG: 500 INJECTION, SOLUTION INTRAVENOUS at 06:28

## 2022-12-20 RX ADMIN — CEFTRIAXONE 1000 MG: 1 INJECTION, POWDER, FOR SOLUTION INTRAMUSCULAR; INTRAVENOUS at 09:02

## 2022-12-20 RX ADMIN — DOCUSATE SODIUM 100 MG: 100 CAPSULE, LIQUID FILLED ORAL at 09:02

## 2022-12-20 RX ADMIN — HYDROCODONE BITARTRATE AND ACETAMINOPHEN 1 TABLET: 5; 325 TABLET ORAL at 00:01

## 2022-12-20 RX ADMIN — METOPROLOL TARTRATE 25 MG: 25 TABLET, FILM COATED ORAL at 09:02

## 2022-12-20 RX ADMIN — POTASSIUM BICARBONATE 50 MEQ: 977.5 TABLET, EFFERVESCENT ORAL at 09:00

## 2022-12-20 RX ADMIN — Medication 10 ML: at 09:03

## 2022-12-20 RX ADMIN — ASPIRIN 81 MG: 81 TABLET, COATED ORAL at 09:02

## 2022-12-20 RX ADMIN — NALOXEGOL OXALATE 25 MG: 12.5 TABLET, FILM COATED ORAL at 06:22

## 2022-12-20 RX ADMIN — ENOXAPARIN SODIUM 40 MG: 100 INJECTION SUBCUTANEOUS at 09:00

## 2022-12-20 RX ADMIN — PANTOPRAZOLE SODIUM 40 MG: 40 INJECTION, POWDER, FOR SOLUTION INTRAVENOUS at 09:01

## 2022-12-20 RX ADMIN — HYDROCODONE BITARTRATE AND ACETAMINOPHEN 1 TABLET: 5; 325 TABLET ORAL at 08:59

## 2022-12-20 ASSESSMENT — PAIN - FUNCTIONAL ASSESSMENT: PAIN_FUNCTIONAL_ASSESSMENT: PREVENTS OR INTERFERES SOME ACTIVE ACTIVITIES AND ADLS

## 2022-12-20 ASSESSMENT — PAIN SCALES - GENERAL: PAINLEVEL_OUTOF10: 8

## 2022-12-20 ASSESSMENT — PAIN DESCRIPTION - ORIENTATION: ORIENTATION: RIGHT;LEFT;UPPER

## 2022-12-20 ASSESSMENT — PAIN DESCRIPTION - LOCATION: LOCATION: ABDOMEN

## 2022-12-20 ASSESSMENT — PAIN DESCRIPTION - DESCRIPTORS: DESCRIPTORS: TENDER;SORE;DISCOMFORT

## 2022-12-20 NOTE — PROGRESS NOTES
Internal Medicine Progress Note    [facility]    Cathy Don. Ema Guevara., F.A.C.OOrquideaI. Claudell Ha, D.O., DESOMARIN Prakash D.O. Raisa Oliveros, MSN, APRN, NP-C  Mary Ellen Ventura. Mary Ching, MSN, APRN-CNP     Primary Care Physician: Branden Richard MD   Admitting Physician:  Mabelene Sandifer, DO  Admission date and time: 12/17/2022  2:32 AM    Room:  [address] [place] [address]  Admitting diagnosis: 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]  Drug-induced constipation [K59.03]  Calculus of gallbladder without cholecystitis without obstruction [K80.20]  Fecal impaction in rectum (HCC) [K56.41]  Intrahepatic bile duct dilation [K83.8]  Fecal impaction [facility]) [K56.41]    Patient Name: Misa Ewing  MRN: [id number]    Date of Service: 12/20/2022     Subjective:  Priscilla Leonardo is a 80 y.o. female who was seen and examined today,12/20/2022, at the bedside. Priscilla Leonardo became increasingly uncomfortable following discharge orders yesterday. I met with her son at the bedside and had a long conversation regarding her pain medication regimen. I suspect she began to suffer some degree of narcotic withdrawal yesterday and narcotics have been reinstituted at her regular levels. On my examination this morning, she states she is feeling dramatically better. She is no longer sweaty and has complete resolution of her abdominal pain. She continues to have bowel movements. She is requesting discharge home today and I stated that I will speak with her family prior to discharge. Review of System:   Constitutional:   Improving malaise and fatigue. HEENT:   Denies ear pain, sore throat, sinus or eye problems. Cardiovascular:   Denies any chest pain, irregular heartbeats, or palpitations. Respiratory:   No shortness of breath. No coughing or sputum production. Gastrointestinal:   No further abdominal pain. She is now having adequate bowel movements. Genitourinary:    Denies any urgency, frequency, hematuria.  Voiding without difficulty. Extremities:   Denies lower extremity swelling, edema or cyanosis. Neurology:    Denies any headache or focal neurological deficits, admits to chronic weakness. Psch:   Denies being anxious or depressed. Musculoskeletal:    Denies  myalgias, joint complaints or back pain. Integumentary:   Denies any rashes, ulcers, or excoriations. Denies bruising. Hematologic/Lymphatic:  Denies bruising or bleeding. Physical Exam:  I/O this shift:  In: 240 [P.O.:240]  Out: 401 [Urine:400; Blood:1]    Intake/Output Summary (Last 24 hours) at 12/20/2022 0627  Last data filed at 12/20/2022 0407  Gross per 24 hour   Intake 360 ml   Output 401 ml   Net -41 ml   I/O last 3 completed shifts: In: 560 [P.O.:560]  Out: -   Patient Vitals for the past 96 hrs (Last 3 readings):   Weight   12/17/22 1152 140 lb (63.5 kg)   12/17/22 0309 140 lb (63.5 kg)   12/17/22 0240 140 lb (63.5 kg)     Vital Signs:   Blood pressure (!) 119/59, pulse 89, temperature 98.6 °F (37 °C), temperature source Oral, resp. rate 16, height 4' 11\" (1.499 m), weight 140 lb (63.5 kg), SpO2 95 %. General appearance:  Alert, responsive, oriented to person, place, and time. Well preserved, alert, no distress. Head:  Normocephalic. No masses, lesions or tenderness. Eyes:  PERRLA. EOMI. Sclera clear. Buccal mucosa moist.  ENT:  Ears normal. Mucosa normal.   Neck:    Supple. Trachea midline. No thyromegaly. No JVD. No bruits. Heart:    Rhythm regular. Rate controlled. Systolic murmur. Lungs:    Symmetrical.  Clear to auscultation bilaterally. Abdomen:   Soft. No tenderness to palpation. Bowel sounds are active. Extremities:    Peripheral pulses present. No peripheral edema. No ulcers. No cyanosis. No clubbing. Neurologic:    Alert x 3. No focal deficit. Cranial nerves grossly intact. No focal weakness. Psych:   Behavior is normal. Mood appears normal. Speech is not rapid and/or pressured.   Musculoskeletal:   Spine ROM normal. Muscular strength intact. Gait not assessed. Integumentary:  No rashes  Skin normal color and texture.   Genitalia/Breast:  Deferred    Medication:  Scheduled Meds:   potassium bicarbonate  50 mEq Oral BID    fentaNYL  1 patch TransDERmal Q72H    docusate sodium  100 mg Oral BID    furosemide  20 mg Oral BID    aspirin  81 mg Oral Daily    atorvastatin  20 mg Oral Nightly    metoprolol tartrate  25 mg Oral BID    [Held by provider] lisinopril  10 mg Oral BID    naloxegol  25 mg Oral QAM AC    pantoprazole  40 mg IntraVENous Daily    metroNIDAZOLE  500 mg IntraVENous Q8H    cefTRIAXone (ROCEPHIN) IV  1,000 mg IntraVENous Q24H    sodium chloride flush  5-40 mL IntraVENous 2 times per day    enoxaparin  40 mg SubCUTAneous Daily     Continuous Infusions:   sodium chloride      dextrose         Objective Data:  CBC with Differential:    Lab Results   Component Value Date/Time    WBC 11.6 12/20/2022 03:11 AM    RBC 3.34 12/20/2022 03:11 AM    HGB 10.7 12/20/2022 03:11 AM    HCT 31.9 12/20/2022 03:11 AM     12/20/2022 03:11 AM    MCV 95.5 12/20/2022 03:11 AM    MCH 32.0 12/20/2022 03:11 AM    MCHC 33.5 12/20/2022 03:11 AM    RDW 13.2 12/20/2022 03:11 AM    LYMPHOPCT 10.7 12/20/2022 03:11 AM    MONOPCT 7.3 12/20/2022 03:11 AM    BASOPCT 0.1 12/20/2022 03:11 AM    MONOSABS 0.84 12/20/2022 03:11 AM    LYMPHSABS 1.24 12/20/2022 03:11 AM    EOSABS 0.01 12/20/2022 03:11 AM    BASOSABS 0.01 12/20/2022 03:11 AM     BMP:    Lab Results   Component Value Date/Time     12/20/2022 03:11 AM    K 2.5 12/20/2022 03:11 AM    K 4.1 12/17/2022 04:50 AM     12/20/2022 03:11 AM    CO2 20 12/20/2022 03:11 AM    BUN 17 12/20/2022 03:11 AM    LABALBU 3.2 12/20/2022 03:11 AM    CREATININE 0.5 12/20/2022 03:11 AM    CALCIUM 8.4 12/20/2022 03:11 AM    GFRAA >60 06/23/2022 05:50 AM    LABGLOM >60 12/20/2022 03:11 AM    GLUCOSE 146 12/20/2022 03:11 AM       Assessment:  Sepsis secondary to urinary tract infection  Fecal impaction of 11.3 cm stool segment with associated proctitis  Transient narcotic withdrawal  Asymptomatic coronary artery disease  Non-insulin-dependent diabetes mellitus type 2  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  Her narcotics have been resumed at their regular levels in the setting of chronic pain enrolled in the hospice program.  Her withdrawal symptomatology has entirely resolved this morning. I will speak with her family when they present to the bedside and the patient is requesting discharge home. She will complete a course of antibiotics for the underlying urinary tract infection and proctitis. A more aggressive bowel regimen has been instituted along with recommendations for daily Movantik. More than 50% of my time was spent at the bedside counseling/coordinating care with the patient and/or family with face to face contact. This time was spent reviewing notes and laboratory data as well as instructing and counseling the patient. Time I spent with the family or surrogate(s) is included only if the patient was incapable of providing the necessary information or participating in medical decisions. I also discussed the differential diagnosis and all of the proposed management plans with the patient and individuals accompanying the patient. I am readily available for any further decision-making and intervention.        Janny Reyes DO, F.A.C.O.I.  12/20/2022  6:27 AM

## 2022-12-20 NOTE — CARE COORDINATION
CM note: Received a call by [facility], Brea Sierra. Pt has been a patient of theirs since July of this year. Pt revoked her hospice services to come to the hospital.  [facility] received a call from patient's son last evening stating that patient would be coming home and they would like hospice again at discharge. Confirmed this with a nurse that was working last evening. Consult written for Hospice at home at discharge and will cancel the [facility] home care. Message left for Hospice liaison and [facility] liaison.

## 2022-12-20 NOTE — CARE COORDINATION
CM note: home care arranged with [facility] yesterday then discharge was held. Will notify [facility] of discharge today. Pt lives with son and he will provide transportation.

## 2022-12-22 LAB
BLOOD CULTURE, ROUTINE: NORMAL
CULTURE, BLOOD 2: NORMAL

## (undated) DEVICE — 6 ML SYRINGE LUER-LOCK TIP: Brand: MONOJECT

## (undated) DEVICE — NEEDLE HYPO 25GA L1.5IN BLU POLYPR HUB S STL REG BVL STR

## (undated) DEVICE — 12 ML SYRINGE,LUER-LOCK TIP: Brand: MONOJECT

## (undated) DEVICE — Z DISCONTINUED APPLICATOR SURG PREP 0.35OZ 2% CHG 70% ISO ALC W/ HI LT

## (undated) DEVICE — KENDALL 450 SERIES MONITORING FOAM ELECTRODE - RECTANGULAR SHAPE ( 3/PK): Brand: KENDALL

## (undated) DEVICE — GOWN SURG XL SMS FAB NONREINFORCED RAGLAN SLV HK LOOP CLSR

## (undated) DEVICE — GLOVE ORANGE PI 7   MSG9070

## (undated) DEVICE — SOLUTION SCRB 32OZ 7.5% POVIDONE IOD BTL GENTLE EFFECTIVE

## (undated) DEVICE — NEEDLE HYPO 18GA L1.5IN PNK POLYPR HUB S STL THN WALL FILL

## (undated) DEVICE — 3M™ RED DOT™ MONITORING ELECTRODE WITH FOAM TAPE AND STICKY GEL 2560, 50/BAG, 20/CASE, 72/PLT: Brand: RED DOT™

## (undated) DEVICE — BANDAGE COMPR W4INXL5YD WHT BGE POLY COT M E WRP WV HK AND

## (undated) DEVICE — GLOVE ORANGE PI 7 1/2   MSG9075

## (undated) DEVICE — 1810 FOAM BLOCK NEEDLE COUNTER: Brand: DEVON

## (undated) DEVICE — PAD N ADH W3XL4IN POLY COT SFT PERF FLM EASILY CUT ABSRB

## (undated) DEVICE — TIBURON EXTREMITY SHEET: Brand: CONVERTORS

## (undated) DEVICE — MASK,FACE,MAXFLUIDPROTECT,SHIELD/ERLPS: Brand: MEDLINE

## (undated) DEVICE — INTENDED FOR TISSUE SEPARATION, AND OTHER PROCEDURES THAT REQUIRE A SHARP SURGICAL BLADE TO PUNCTURE OR CUT.: Brand: BARD-PARKER ® STAINLESS STEEL BLADES

## (undated) DEVICE — FORCEPS BX L240CM JAW DIA2.8MM L CAP W/ NDL MIC MESH TOOTH

## (undated) DEVICE — GLOVE ORANGE PI 8   MSG9080

## (undated) DEVICE — CVD CANNULA

## (undated) DEVICE — GAUZE,SPONGE,4"X4",12PLY,STERILE,LF,2'S: Brand: MEDLINE

## (undated) DEVICE — BANDAGE ADH W1XL3IN NAT FAB WVN FLX DURABLE N ADH PD SEAL

## (undated) DEVICE — ELECTRODE SURG MPLR NEUT SELF ADH PT PLT MULTIGEN

## (undated) DEVICE — TOWEL OR BLUEE 16X26IN ST 8 PACK ORB08 16X26ORTWL

## (undated) DEVICE — SPONGE GZ 4IN 4IN 4 PLY N WVN AVANT

## (undated) DEVICE — BASIC PACK: Brand: CONVERTORS

## (undated) DEVICE — PEN: MARKING STD 100/CS: Brand: MEDICAL ACTION INDUSTRIES

## (undated) DEVICE — KIT BEDSIDE REVITAL OX 500ML

## (undated) DEVICE — SUTURE ETHLN SZ 4-0 L18IN NONABSORBABLE BLK L19MM PS-2 3/8 1667H

## (undated) DEVICE — MARKER,SKIN,WI/RULER AND LABELS: Brand: MEDLINE

## (undated) DEVICE — 20 ML SYRINGE REGULAR TIP: Brand: MONOJECT

## (undated) DEVICE — NEEDLE SPNL 22GA L3.5IN BLK HUB S STL REG WALL FIT STYL W/

## (undated) DEVICE — CATHETER IV 20GA L1.16IN OD1.080MM ID0.800MM 60ML/MIN PNK

## (undated) DEVICE — GAUZE,SPONGE,4"X4",16PLY,XRAY,STRL,LF: Brand: MEDLINE

## (undated) DEVICE — LUBRICANT SURG JELLY ST BACTER TUBE 4.25OZ

## (undated) DEVICE — Device: Brand: DEFENDO VALVE AND CONNECTOR KIT

## (undated) DEVICE — CONTAINER SPEC COLL 960ML POLYPR TRIANG GRAD INTAKE/OUTPUT

## (undated) DEVICE — PENCIL ES L3M BTTN SWCH HOLSTER W/ BLDE ELECTRD EDGE

## (undated) DEVICE — 3 ML SYRINGE LUER-LOCK TIP: Brand: MONOJECT

## (undated) DEVICE — SOLUTION IV IRRIG POUR BRL 0.9% SODIUM CHL 2F7124

## (undated) DEVICE — TUBING, SUCTION, 1/4" X 10', STRAIGHT: Brand: MEDLINE

## (undated) DEVICE — CUFF TOURNIQUET 18 SNG BLADDER DUAL PORT

## (undated) DEVICE — 22GX5" WHITACRE SPINAL NEEDLE: Brand: MEDLINE

## (undated) DEVICE — 6 X 9  1.75MIL 4-WALL LABGUARD: Brand: MINIGRIP COMMERCIAL LLC

## (undated) DEVICE — CONTROL SYRINGE LUER-LOCK TIP: Brand: MONOJECT

## (undated) DEVICE — ELECTRODE PT RET AD L9FT HI MOIST COND ADH HYDRGEL CORDED

## (undated) DEVICE — GOWN ISOLATN REG YEL M WT MULTIPLY SIDETIE LEV 2

## (undated) DEVICE — BANDAGE,GAUZE,BULKEE II,4.5"X4.1YD,STRL: Brand: MEDLINE

## (undated) DEVICE — HANDLE CVR PATENTED RETENTION DISC STRL LIGHT SHLD

## (undated) DEVICE — Z DISCONTINUED NO SUB IDED DRAIN PENROSE L12IN 0.25IN USED TO PROMOTE DRNAGE IN OPN

## (undated) DEVICE — BANDAGE COMPR W4XL108IN WHT LAYERED NO CLSR SYN RUB ESMARCH